# Patient Record
Sex: FEMALE | Race: WHITE | NOT HISPANIC OR LATINO | Employment: FULL TIME | URBAN - METROPOLITAN AREA
[De-identification: names, ages, dates, MRNs, and addresses within clinical notes are randomized per-mention and may not be internally consistent; named-entity substitution may affect disease eponyms.]

---

## 2020-10-02 ENCOUNTER — OFFICE VISIT (OUTPATIENT)
Dept: OBSTETRICS AND GYNECOLOGY | Facility: CLINIC | Age: 40
End: 2020-10-02

## 2020-10-02 VITALS
DIASTOLIC BLOOD PRESSURE: 80 MMHG | HEIGHT: 63 IN | BODY MASS INDEX: 28.53 KG/M2 | RESPIRATION RATE: 14 BRPM | WEIGHT: 161 LBS | SYSTOLIC BLOOD PRESSURE: 120 MMHG

## 2020-10-02 DIAGNOSIS — O03.9 COMPLETE ABORTION: Primary | ICD-10-CM

## 2020-10-02 PROCEDURE — 99203 OFFICE O/P NEW LOW 30 MIN: CPT | Performed by: OBSTETRICS & GYNECOLOGY

## 2020-10-02 RX ORDER — CETIRIZINE HYDROCHLORIDE 10 MG/1
10 TABLET ORAL DAILY
COMMUNITY

## 2020-10-02 RX ORDER — FLUTICASONE PROPIONATE 50 MCG
2 SPRAY, SUSPENSION (ML) NASAL DAILY
COMMUNITY

## 2020-10-02 RX ORDER — PNV,CALCIUM 72/IRON/FOLIC ACID 27 MG-1 MG
TABLET ORAL
COMMUNITY
Start: 2020-08-28

## 2020-10-02 NOTE — PROGRESS NOTES
Subjective   Ladonna Gil is a 40 y.o. female is here today as a self referral.    Chief Complaint   Patient presents with   • Establish Care     Patient is here today to establish care she is 40 years of age and wants to have a baby. She had a miscarriage last month.         History of Present Illness  Patient is been trying to become pregnant for 2 years.  She was late on her cycle in August and had a positive urine pregnancy test.  She saw her OB/GYN and a beta-hCG was 33.  The patient repeated the beta-hCG 3 days later and it was falling.  Patient started bleeding at that time.  This lasted the usual amount of time for a period and now has stopped.  Patient is now waiting for her next cycle.  A discussion on infertility ensued.  Patient wants to do testing here.  It was suggested she see a reproductive endocrinologist because of her age.  Patient will call when next cycle starts.  She has been doing ovulation predicting testing.  She has a good understanding of infertility.  She has had problems having sexual relationships with her  at the appropriate time to become pregnant.. The patient is O+  The following portions of the patient's history were reviewed and updated as appropriate: allergies, current medications, past family history, past medical history, past social history, past surgical history and problem list.    Review of Systems - History obtained from the patient  General ROS: negative  Psychological ROS: positive for - anxiety and depression  ENT ROS: positive for - nasal congestion  Allergy and Immunology ROS: positive for - seasonal allergies  Hematological and Lymphatic ROS: negative  Endocrine ROS: negative  Breast ROS: negative for breast lumps  Respiratory ROS: no cough, shortness of breath, or wheezing  Cardiovascular ROS: no chest pain or dyspnea on exertion  Gastrointestinal ROS: no abdominal pain, change in bowel habits, or black or bloody stools  Genito-Urinary ROS: no dysuria,  trouble voiding, or hematuria  Musculoskeletal ROS: negative  Neurological ROS: no TIA or stroke symptoms  Dermatological ROS: negative     Objective   General:  well developed; well nourished  no acute distress   Skin:  No suspicious lesions seen   Thyroid: not examined   Breasts:  Examined in supine position  Symmetric without masses or skin dimpling  Nipples normal without inversion, lesions or discharge  There are no palpable axillary nodes   Abdomen: soft, non-tender; no masses  no umbilical or inguinal hernias are present  no hepato-splenomegaly   Heart: regular rate and rhythm, S1, S2 normal, no murmur, click, rub or gallop   Lungs: clear to auscultation   Pelvis: Clinical staff was present for exam  External genitalia:  normal appearance of the external genitalia including Bartholin's and Lovelock's glands.  :  urethral meatus normal;  Vaginal:  normal pink mucosa without prolapse or lesions. blood present -  small amount;  Cervix:  normal appearance.  Uterus:  normal size, shape and consistency.  Adnexa:  normal bimanual exam of the adnexa.  Rectal:  digital rectal exam not performed; anus visually normal appearing.         Assessment/Plan   Ladonna was seen today for establish care.    Diagnoses and all orders for this visit:    Complete abortion        Call if ovulation predictor test is positive  Beta-hCG and serum progesterone of late on cycle  Prometrium if needed    Tobias Shelton MD

## 2020-10-21 DIAGNOSIS — N92.6 IRREGULAR PERIODS: Primary | ICD-10-CM

## 2020-10-21 DIAGNOSIS — N92.6 IRREGULAR PERIODS: ICD-10-CM

## 2020-10-27 ENCOUNTER — LAB (OUTPATIENT)
Dept: LAB | Facility: HOSPITAL | Age: 40
End: 2020-10-27

## 2020-10-27 DIAGNOSIS — N92.6 IRREGULAR PERIODS: ICD-10-CM

## 2020-10-27 DIAGNOSIS — N92.6 IRREGULAR PERIODS: Primary | ICD-10-CM

## 2020-10-27 PROCEDURE — 84144 ASSAY OF PROGESTERONE: CPT

## 2020-10-27 PROCEDURE — 36415 COLL VENOUS BLD VENIPUNCTURE: CPT

## 2020-10-28 LAB — PROGEST SERPL-MCNC: 12.9 NG/ML

## 2020-10-29 ENCOUNTER — TELEPHONE (OUTPATIENT)
Dept: OBSTETRICS AND GYNECOLOGY | Facility: CLINIC | Age: 40
End: 2020-10-29

## 2020-10-29 NOTE — TELEPHONE ENCOUNTER
Patient was called and informed that her progesterone level was high indicating ovulation.    Tobias Shelton MD

## 2021-11-10 ENCOUNTER — LAB (OUTPATIENT)
Dept: LAB | Facility: HOSPITAL | Age: 41
End: 2021-11-10

## 2021-11-10 ENCOUNTER — TRANSCRIBE ORDERS (OUTPATIENT)
Dept: LAB | Facility: HOSPITAL | Age: 41
End: 2021-11-10

## 2021-11-10 DIAGNOSIS — Z3A.12 12 WEEKS GESTATION OF PREGNANCY: Primary | ICD-10-CM

## 2021-11-10 DIAGNOSIS — Z3A.12 12 WEEKS GESTATION OF PREGNANCY: ICD-10-CM

## 2021-11-10 DIAGNOSIS — Z34.81 PRENATAL CARE, SUBSEQUENT PREGNANCY, FIRST TRIMESTER: ICD-10-CM

## 2021-11-10 LAB
ABO GROUP BLD: NORMAL
AMPHET+METHAMPHET UR QL: NEGATIVE
AMPHETAMINES UR QL: NEGATIVE
BARBITURATES UR QL SCN: NEGATIVE
BASOPHILS # BLD AUTO: 0.04 10*3/MM3 (ref 0–0.2)
BASOPHILS NFR BLD AUTO: 0.5 % (ref 0–1.5)
BENZODIAZ UR QL SCN: NEGATIVE
BILIRUB UR QL STRIP: NEGATIVE
BLD GP AB SCN SERPL QL: NEGATIVE
BUPRENORPHINE SERPL-MCNC: NEGATIVE NG/ML
CANNABINOIDS SERPL QL: NEGATIVE
CLARITY UR: CLEAR
COCAINE UR QL: NEGATIVE
COLOR UR: YELLOW
DEPRECATED RDW RBC AUTO: 42.7 FL (ref 37–54)
EOSINOPHIL # BLD AUTO: 0.06 10*3/MM3 (ref 0–0.4)
EOSINOPHIL NFR BLD AUTO: 0.7 % (ref 0.3–6.2)
ERYTHROCYTE [DISTWIDTH] IN BLOOD BY AUTOMATED COUNT: 12.4 % (ref 12.3–15.4)
GLUCOSE UR STRIP-MCNC: NEGATIVE MG/DL
HCT VFR BLD AUTO: 39.7 % (ref 34–46.6)
HGB BLD-MCNC: 13.2 G/DL (ref 12–15.9)
HGB UR QL STRIP.AUTO: NEGATIVE
IMM GRANULOCYTES # BLD AUTO: 0.03 10*3/MM3 (ref 0–0.05)
IMM GRANULOCYTES NFR BLD AUTO: 0.3 % (ref 0–0.5)
KETONES UR QL STRIP: NEGATIVE
LEUKOCYTE ESTERASE UR QL STRIP.AUTO: NEGATIVE
LYMPHOCYTES # BLD AUTO: 1.68 10*3/MM3 (ref 0.7–3.1)
LYMPHOCYTES NFR BLD AUTO: 19.4 % (ref 19.6–45.3)
MCH RBC QN AUTO: 31.5 PG (ref 26.6–33)
MCHC RBC AUTO-ENTMCNC: 33.2 G/DL (ref 31.5–35.7)
MCV RBC AUTO: 94.7 FL (ref 79–97)
METHADONE UR QL SCN: NEGATIVE
MONOCYTES # BLD AUTO: 0.55 10*3/MM3 (ref 0.1–0.9)
MONOCYTES NFR BLD AUTO: 6.3 % (ref 5–12)
NEUTROPHILS NFR BLD AUTO: 6.31 10*3/MM3 (ref 1.7–7)
NEUTROPHILS NFR BLD AUTO: 72.8 % (ref 42.7–76)
NITRITE UR QL STRIP: NEGATIVE
NRBC BLD AUTO-RTO: 0 /100 WBC (ref 0–0.2)
OPIATES UR QL: NEGATIVE
OXYCODONE UR QL SCN: NEGATIVE
PCP UR QL SCN: NEGATIVE
PH UR STRIP.AUTO: 7 [PH] (ref 5–8)
PLATELET # BLD AUTO: 314 10*3/MM3 (ref 140–450)
PMV BLD AUTO: 11.6 FL (ref 6–12)
PROPOXYPH UR QL: NEGATIVE
PROT UR QL STRIP: NEGATIVE
RBC # BLD AUTO: 4.19 10*6/MM3 (ref 3.77–5.28)
RH BLD: POSITIVE
SP GR UR STRIP: 1.01 (ref 1–1.03)
TRICYCLICS UR QL SCN: NEGATIVE
UROBILINOGEN UR QL STRIP: NORMAL
WBC # BLD AUTO: 8.67 10*3/MM3 (ref 3.4–10.8)

## 2021-11-10 PROCEDURE — 86778 TOXOPLASMA ANTIBODY IGM: CPT

## 2021-11-10 PROCEDURE — 81003 URINALYSIS AUTO W/O SCOPE: CPT

## 2021-11-10 PROCEDURE — 84443 ASSAY THYROID STIM HORMONE: CPT

## 2021-11-10 PROCEDURE — 36415 COLL VENOUS BLD VENIPUNCTURE: CPT

## 2021-11-10 PROCEDURE — 86803 HEPATITIS C AB TEST: CPT

## 2021-11-10 PROCEDURE — 80306 DRUG TEST PRSMV INSTRMNT: CPT

## 2021-11-10 PROCEDURE — 82306 VITAMIN D 25 HYDROXY: CPT

## 2021-11-10 PROCEDURE — 80081 OBSTETRIC PANEL INC HIV TSTG: CPT

## 2021-11-10 PROCEDURE — 86777 TOXOPLASMA ANTIBODY: CPT

## 2021-11-11 LAB
25(OH)D3 SERPL-MCNC: 38.5 NG/ML (ref 30–100)
HBV SURFACE AG SERPL QL IA: NORMAL
HCV AB SER DONR QL: NORMAL
HIV1+2 AB SER QL: NORMAL
RPR SER QL: NORMAL
TSH SERPL DL<=0.05 MIU/L-ACNC: 1.71 UIU/ML (ref 0.27–4.2)

## 2021-11-12 LAB
LABORATORY COMMENT REPORT: NORMAL
RUBV IGG SERPL IA-ACNC: 14.9 INDEX
T GONDII IGG SERPL IA-ACNC: <3 IU/ML (ref 0–7.1)
T GONDII IGM SER IA-ACNC: <3 AU/ML (ref 0–7.9)

## 2022-02-08 ENCOUNTER — TRANSCRIBE ORDERS (OUTPATIENT)
Dept: LAB | Facility: HOSPITAL | Age: 42
End: 2022-02-08

## 2022-02-08 ENCOUNTER — LAB (OUTPATIENT)
Dept: LAB | Facility: HOSPITAL | Age: 42
End: 2022-02-08

## 2022-02-08 DIAGNOSIS — O09.522 SUPERVISION OF ELDERLY MULTIGRAVIDA IN SECOND TRIMESTER: Primary | ICD-10-CM

## 2022-02-08 DIAGNOSIS — O09.522 SUPERVISION OF ELDERLY MULTIGRAVIDA IN SECOND TRIMESTER: ICD-10-CM

## 2022-02-08 LAB
BLD GP AB SCN SERPL QL: NEGATIVE
DEPRECATED RDW RBC AUTO: 44.2 FL (ref 37–54)
ERYTHROCYTE [DISTWIDTH] IN BLOOD BY AUTOMATED COUNT: 12.7 % (ref 12.3–15.4)
GLUCOSE 1H P GLC SERPL-MCNC: 90 MG/DL (ref 65–139)
GLUCOSE P FAST SERPL-MCNC: 84 MG/DL (ref 65–94)
HCT VFR BLD AUTO: 36.6 % (ref 34–46.6)
HGB BLD-MCNC: 12.8 G/DL (ref 12–15.9)
MCH RBC QN AUTO: 32.6 PG (ref 26.6–33)
MCHC RBC AUTO-ENTMCNC: 35 G/DL (ref 31.5–35.7)
MCV RBC AUTO: 93.1 FL (ref 79–97)
PLATELET # BLD AUTO: 309 10*3/MM3 (ref 140–450)
PMV BLD AUTO: 11 FL (ref 6–12)
RBC # BLD AUTO: 3.93 10*6/MM3 (ref 3.77–5.28)
WBC NRBC COR # BLD: 9.4 10*3/MM3 (ref 3.4–10.8)

## 2022-02-08 PROCEDURE — 82950 GLUCOSE TEST: CPT

## 2022-02-08 PROCEDURE — 85027 COMPLETE CBC AUTOMATED: CPT

## 2022-02-08 PROCEDURE — 82962 GLUCOSE BLOOD TEST: CPT

## 2022-02-08 PROCEDURE — 82947 ASSAY GLUCOSE BLOOD QUANT: CPT

## 2022-02-08 PROCEDURE — 36415 COLL VENOUS BLD VENIPUNCTURE: CPT

## 2022-02-08 PROCEDURE — 86850 RBC ANTIBODY SCREEN: CPT

## 2022-03-12 ENCOUNTER — HOSPITAL ENCOUNTER (INPATIENT)
Facility: HOSPITAL | Age: 42
LOS: 1 days | Discharge: ANOTHER HOSPITAL | End: 2022-03-14
Attending: NURSE PRACTITIONER | Admitting: OBSTETRICS & GYNECOLOGY

## 2022-03-12 PROBLEM — Z87.59 HISTORY OF PRETERM PREMATURE RUPTURE OF MEMBRANES (PPROM): Status: ACTIVE | Noted: 2022-03-12

## 2022-03-12 LAB
ABO GROUP BLD: NORMAL
ALP SERPL-CCNC: 77 U/L (ref 39–117)
ALT SERPL W P-5'-P-CCNC: 28 U/L (ref 1–33)
AST SERPL-CCNC: 22 U/L (ref 1–32)
BILIRUB SERPL-MCNC: 0.2 MG/DL (ref 0–1.2)
BLD GP AB SCN SERPL QL: NEGATIVE
CREAT SERPL-MCNC: 0.58 MG/DL (ref 0.57–1)
DEPRECATED RDW RBC AUTO: 41.7 FL (ref 37–54)
ERYTHROCYTE [DISTWIDTH] IN BLOOD BY AUTOMATED COUNT: 12.7 % (ref 12.3–15.4)
HCT VFR BLD AUTO: 33.3 % (ref 34–46.6)
HGB BLD-MCNC: 12.1 G/DL (ref 12–15.9)
LDH SERPL-CCNC: 410 U/L (ref 135–214)
MCH RBC QN AUTO: 32.7 PG (ref 26.6–33)
MCHC RBC AUTO-ENTMCNC: 36.3 G/DL (ref 31.5–35.7)
MCV RBC AUTO: 90 FL (ref 79–97)
PLATELET # BLD AUTO: 302 10*3/MM3 (ref 140–450)
PMV BLD AUTO: 10.3 FL (ref 6–12)
RBC # BLD AUTO: 3.7 10*6/MM3 (ref 3.77–5.28)
RH BLD: POSITIVE
T&S EXPIRATION DATE: NORMAL
URATE SERPL-MCNC: 2.7 MG/DL (ref 2.4–5.7)
WBC NRBC COR # BLD: 10.96 10*3/MM3 (ref 3.4–10.8)

## 2022-03-12 PROCEDURE — 83615 LACTATE (LD) (LDH) ENZYME: CPT | Performed by: OBSTETRICS & GYNECOLOGY

## 2022-03-12 PROCEDURE — 82565 ASSAY OF CREATININE: CPT | Performed by: OBSTETRICS & GYNECOLOGY

## 2022-03-12 PROCEDURE — 84550 ASSAY OF BLOOD/URIC ACID: CPT | Performed by: OBSTETRICS & GYNECOLOGY

## 2022-03-12 PROCEDURE — 0 MAGNESIUM SULFATE 20 GM/500ML SOLUTION: Performed by: OBSTETRICS & GYNECOLOGY

## 2022-03-12 PROCEDURE — 86901 BLOOD TYPING SEROLOGIC RH(D): CPT | Performed by: OBSTETRICS & GYNECOLOGY

## 2022-03-12 PROCEDURE — 86850 RBC ANTIBODY SCREEN: CPT | Performed by: OBSTETRICS & GYNECOLOGY

## 2022-03-12 PROCEDURE — 85027 COMPLETE CBC AUTOMATED: CPT | Performed by: OBSTETRICS & GYNECOLOGY

## 2022-03-12 PROCEDURE — 86900 BLOOD TYPING SEROLOGIC ABO: CPT | Performed by: OBSTETRICS & GYNECOLOGY

## 2022-03-12 PROCEDURE — 82247 BILIRUBIN TOTAL: CPT | Performed by: OBSTETRICS & GYNECOLOGY

## 2022-03-12 PROCEDURE — 25010000002 AMPICILLIN PER 500 MG: Performed by: OBSTETRICS & GYNECOLOGY

## 2022-03-12 PROCEDURE — 84075 ASSAY ALKALINE PHOSPHATASE: CPT | Performed by: OBSTETRICS & GYNECOLOGY

## 2022-03-12 PROCEDURE — 25010000002 BETAMETHASONE ACET & SOD PHOS PER 4 MG: Performed by: OBSTETRICS & GYNECOLOGY

## 2022-03-12 PROCEDURE — 84460 ALANINE AMINO (ALT) (SGPT): CPT | Performed by: OBSTETRICS & GYNECOLOGY

## 2022-03-12 PROCEDURE — 36415 COLL VENOUS BLD VENIPUNCTURE: CPT | Performed by: OBSTETRICS & GYNECOLOGY

## 2022-03-12 PROCEDURE — 84450 TRANSFERASE (AST) (SGOT): CPT | Performed by: OBSTETRICS & GYNECOLOGY

## 2022-03-12 RX ORDER — PRENATAL VIT/IRON FUM/FOLIC AC 27MG-0.8MG
1 TABLET ORAL DAILY
Status: DISCONTINUED | OUTPATIENT
Start: 2022-03-13 | End: 2022-03-14 | Stop reason: HOSPADM

## 2022-03-12 RX ORDER — SODIUM CHLORIDE 0.9 % (FLUSH) 0.9 %
10 SYRINGE (ML) INJECTION EVERY 12 HOURS SCHEDULED
Status: DISCONTINUED | OUTPATIENT
Start: 2022-03-12 | End: 2022-03-14

## 2022-03-12 RX ORDER — ERGOCALCIFEROL (VITAMIN D2) 10 MCG
400 TABLET ORAL DAILY
COMMUNITY

## 2022-03-12 RX ORDER — PROMETHAZINE HYDROCHLORIDE 12.5 MG/1
12.5 TABLET ORAL EVERY 6 HOURS PRN
Status: DISCONTINUED | OUTPATIENT
Start: 2022-03-12 | End: 2022-03-14

## 2022-03-12 RX ORDER — AZITHROMYCIN 250 MG/1
1000 TABLET, FILM COATED ORAL ONCE
Status: COMPLETED | OUTPATIENT
Start: 2022-03-12 | End: 2022-03-13

## 2022-03-12 RX ORDER — BETAMETHASONE SODIUM PHOSPHATE AND BETAMETHASONE ACETATE 3; 3 MG/ML; MG/ML
12 INJECTION, SUSPENSION INTRA-ARTICULAR; INTRALESIONAL; INTRAMUSCULAR; SOFT TISSUE EVERY 24 HOURS
Status: DISCONTINUED | OUTPATIENT
Start: 2022-03-12 | End: 2022-03-13

## 2022-03-12 RX ORDER — ACETAMINOPHEN 325 MG/1
650 TABLET ORAL EVERY 4 HOURS PRN
Status: DISCONTINUED | OUTPATIENT
Start: 2022-03-12 | End: 2022-03-14 | Stop reason: HOSPADM

## 2022-03-12 RX ORDER — ONDANSETRON 4 MG/1
4 TABLET, FILM COATED ORAL EVERY 8 HOURS PRN
Status: DISCONTINUED | OUTPATIENT
Start: 2022-03-12 | End: 2022-03-14

## 2022-03-12 RX ORDER — PROMETHAZINE HYDROCHLORIDE 12.5 MG/1
12.5 SUPPOSITORY RECTAL EVERY 6 HOURS PRN
Status: DISCONTINUED | OUTPATIENT
Start: 2022-03-12 | End: 2022-03-14

## 2022-03-12 RX ORDER — FLUTICASONE PROPIONATE 50 MCG
2 SPRAY, SUSPENSION (ML) NASAL DAILY
Status: DISCONTINUED | OUTPATIENT
Start: 2022-03-13 | End: 2022-03-14 | Stop reason: HOSPADM

## 2022-03-12 RX ORDER — SODIUM CHLORIDE 0.9 % (FLUSH) 0.9 %
10 SYRINGE (ML) INJECTION AS NEEDED
Status: DISCONTINUED | OUTPATIENT
Start: 2022-03-12 | End: 2022-03-14

## 2022-03-12 RX ORDER — ONDANSETRON 2 MG/ML
4 INJECTION INTRAMUSCULAR; INTRAVENOUS EVERY 8 HOURS PRN
Status: DISCONTINUED | OUTPATIENT
Start: 2022-03-12 | End: 2022-03-14

## 2022-03-12 RX ORDER — AZITHROMYCIN 250 MG/1
1000 TABLET, FILM COATED ORAL ONCE
Status: DISCONTINUED | OUTPATIENT
Start: 2022-03-12 | End: 2022-03-12

## 2022-03-12 RX ORDER — AMOXICILLIN 250 MG/1
500 CAPSULE ORAL EVERY 8 HOURS
Status: DISCONTINUED | OUTPATIENT
Start: 2022-03-14 | End: 2022-03-12

## 2022-03-12 RX ORDER — SODIUM CHLORIDE, SODIUM LACTATE, POTASSIUM CHLORIDE, CALCIUM CHLORIDE 600; 310; 30; 20 MG/100ML; MG/100ML; MG/100ML; MG/100ML
30 INJECTION, SOLUTION INTRAVENOUS CONTINUOUS
Status: DISCONTINUED | OUTPATIENT
Start: 2022-03-12 | End: 2022-03-14

## 2022-03-12 RX ORDER — ASPIRIN 81 MG/1
81 TABLET, CHEWABLE ORAL DAILY
COMMUNITY

## 2022-03-12 RX ORDER — AMOXICILLIN 250 MG/1
500 CAPSULE ORAL EVERY 8 HOURS SCHEDULED
Status: DISCONTINUED | OUTPATIENT
Start: 2022-03-14 | End: 2022-03-14

## 2022-03-12 RX ORDER — LIDOCAINE HYDROCHLORIDE 10 MG/ML
5 INJECTION, SOLUTION EPIDURAL; INFILTRATION; INTRACAUDAL; PERINEURAL AS NEEDED
Status: DISCONTINUED | OUTPATIENT
Start: 2022-03-12 | End: 2022-03-14

## 2022-03-12 RX ORDER — MAGNESIUM SULFATE HEPTAHYDRATE 40 MG/ML
2 INJECTION, SOLUTION INTRAVENOUS CONTINUOUS
Status: DISCONTINUED | OUTPATIENT
Start: 2022-03-12 | End: 2022-03-14

## 2022-03-12 RX ADMIN — AMPICILLIN 2 G: 2 INJECTION, POWDER, FOR SOLUTION INTRAVENOUS at 22:26

## 2022-03-12 RX ADMIN — BETAMETHASONE SODIUM PHOSPHATE AND BETAMETHASONE ACETATE 12 MG: 3; 3 INJECTION, SUSPENSION INTRA-ARTICULAR; INTRALESIONAL; INTRAMUSCULAR at 21:45

## 2022-03-12 RX ADMIN — MAGNESIUM SULFATE IN WATER 2 G/HR: 40 INJECTION, SOLUTION INTRAVENOUS at 22:00

## 2022-03-12 RX ADMIN — SODIUM CHLORIDE, POTASSIUM CHLORIDE, SODIUM LACTATE AND CALCIUM CHLORIDE 30 ML/HR: 600; 310; 30; 20 INJECTION, SOLUTION INTRAVENOUS at 21:30

## 2022-03-13 PROBLEM — O42.919 PRETERM PREMATURE RUPTURE OF MEMBRANES (PPROM) WITH UNKNOWN ONSET OF LABOR: Status: ACTIVE | Noted: 2022-03-13

## 2022-03-13 LAB — POC AMNISURE: POSITIVE

## 2022-03-13 PROCEDURE — 84112 EVAL AMNIOTIC FLUID PROTEIN: CPT | Performed by: OBSTETRICS & GYNECOLOGY

## 2022-03-13 PROCEDURE — 25010000002 BETAMETHASONE ACET & SOD PHOS PER 4 MG: Performed by: OBSTETRICS & GYNECOLOGY

## 2022-03-13 PROCEDURE — 0 MAGNESIUM SULFATE 20 GM/500ML SOLUTION: Performed by: OBSTETRICS & GYNECOLOGY

## 2022-03-13 PROCEDURE — 59025 FETAL NON-STRESS TEST: CPT

## 2022-03-13 PROCEDURE — 25010000002 AMPICILLIN PER 500 MG: Performed by: OBSTETRICS & GYNECOLOGY

## 2022-03-13 PROCEDURE — 99221 1ST HOSP IP/OBS SF/LOW 40: CPT | Performed by: OBSTETRICS & GYNECOLOGY

## 2022-03-13 RX ORDER — ECHINACEA PURPUREA EXTRACT 125 MG
2 TABLET ORAL AS NEEDED
Status: DISCONTINUED | OUTPATIENT
Start: 2022-03-13 | End: 2022-03-14 | Stop reason: HOSPADM

## 2022-03-13 RX ORDER — BETAMETHASONE SODIUM PHOSPHATE AND BETAMETHASONE ACETATE 3; 3 MG/ML; MG/ML
12 INJECTION, SUSPENSION INTRA-ARTICULAR; INTRALESIONAL; INTRAMUSCULAR; SOFT TISSUE EVERY 12 HOURS
Status: COMPLETED | OUTPATIENT
Start: 2022-03-13 | End: 2022-03-13

## 2022-03-13 RX ORDER — BETAMETHASONE SODIUM PHOSPHATE AND BETAMETHASONE ACETATE 3; 3 MG/ML; MG/ML
12 INJECTION, SUSPENSION INTRA-ARTICULAR; INTRALESIONAL; INTRAMUSCULAR; SOFT TISSUE EVERY 12 HOURS
Status: DISCONTINUED | OUTPATIENT
Start: 2022-03-13 | End: 2022-03-13

## 2022-03-13 RX ORDER — FAMOTIDINE 20 MG/1
20 TABLET, FILM COATED ORAL NIGHTLY
Status: DISCONTINUED | OUTPATIENT
Start: 2022-03-13 | End: 2022-03-14

## 2022-03-13 RX ADMIN — BETAMETHASONE SODIUM PHOSPHATE AND BETAMETHASONE ACETATE 12 MG: 3; 3 INJECTION, SUSPENSION INTRA-ARTICULAR; INTRALESIONAL; INTRAMUSCULAR at 10:48

## 2022-03-13 RX ADMIN — AMPICILLIN 2 G: 2 INJECTION, POWDER, FOR SOLUTION INTRAVENOUS at 18:30

## 2022-03-13 RX ADMIN — AMPICILLIN 2 G: 2 INJECTION, POWDER, FOR SOLUTION INTRAVENOUS at 11:46

## 2022-03-13 RX ADMIN — MAGNESIUM SULFATE IN WATER 2 G/HR: 40 INJECTION, SOLUTION INTRAVENOUS at 06:03

## 2022-03-13 RX ADMIN — FAMOTIDINE 20 MG: 20 TABLET, FILM COATED ORAL at 20:44

## 2022-03-13 RX ADMIN — MAGNESIUM SULFATE IN WATER 2 G/HR: 40 INJECTION, SOLUTION INTRAVENOUS at 16:51

## 2022-03-13 RX ADMIN — AMPICILLIN 2 G: 2 INJECTION, POWDER, FOR SOLUTION INTRAVENOUS at 06:07

## 2022-03-13 RX ADMIN — PRENATAL VITAMINS-IRON FUMARATE 27 MG IRON-FOLIC ACID 0.8 MG TABLET 1 TABLET: at 08:12

## 2022-03-13 RX ADMIN — AZITHROMYCIN DIHYDRATE 1000 MG: 250 TABLET, FILM COATED ORAL at 03:01

## 2022-03-13 RX ADMIN — Medication 10 ML: at 08:18

## 2022-03-13 RX ADMIN — AMPICILLIN 2 G: 2 INJECTION, POWDER, FOR SOLUTION INTRAVENOUS at 23:30

## 2022-03-13 RX ADMIN — ACETAMINOPHEN 650 MG: 325 TABLET ORAL at 19:37

## 2022-03-13 RX ADMIN — SODIUM CHLORIDE, POTASSIUM CHLORIDE, SODIUM LACTATE AND CALCIUM CHLORIDE 30 ML/HR: 600; 310; 30; 20 INJECTION, SOLUTION INTRAVENOUS at 16:51

## 2022-03-13 NOTE — H&P
[facility]  Obstetric History and Physical    Chief Complaint   Patient presents with   • Rupture of Membranes       HPI:    Patient is a 42 y.o. female  currently at 29w6d, who presents with grossly ruptured membranes that occurred around 2000 hrs.  She says she has up to this point had an uneventful pregnancy including a normal anatomy scan at 20 weeks.   She denies vaginal bleeding prior to arrival, but has past a small clot since being here.   +FM.   She is starting to feel more contractions and it seem to be getting worse even after the Magnesium was started.    She denies fever or chills.  She denies uterine tenderness other than the contractions.         Prenatal Information:  Prenatal Results     POC Urine Glucose/Protein     Test Value Reference Range Date Time    Urine Glucose        Urine Protein              Initial Prenatal Labs     Test Value Reference Range Date Time    Hemoglobin  12.1 g/dL 12.0 - 15.9 22       12.8 g/dL 12.0 - 15.9 22 0954       13.2 g/dL 12.0 - 15.9 11/10/21 1702    Hematocrit  33.3 % 34.0 - 46.6 22       36.6 % 34.0 - 46.6 22 0954       39.7 % 34.0 - 46.6 11/10/21 1702    Platelets  302 10*3/mm3 140 - 450 220       309 10*3/mm3 140 - 450 22 0954       314 10*3/mm3 140 - 450 11/10/21 1702    Rubella IgG  14.90 index Immune >0.99 11/10/21 1701    Hepatitis B SAg  Non-Reactive  Non-Reactive 11/10/21 1702    Hepatitis C Ab  Non-Reactive  Non-Reactive 11/10/21 1701    RPR  Non-Reactive  Non-Reactive 11/10/21 1701    ABO  O   22    Rh  Positive   22    Antibody Screen  Negative   22       Negative   22       Negative   11/10/21 1701    HIV  Non-Reactive  Non-Reactive 11/10/21 1701    Urine Culture        Gonorrhea        Chlamydia        TSH  1.710 uIU/mL 0.270 - 4.200 11/10/21 1701    HgB A1c               2nd and 3rd Trimester     Test Value Reference Range Date Time    Hemoglobin  (repeated)  12.1 g/dL 12.0 - 15.9 03/12/22 2140       12.8 g/dL 12.0 - 15.9 02/08/22 0954    Hematocrit (repeated)  33.3 % 34.0 - 46.6 03/12/22 2140       36.6 % 34.0 - 46.6 02/08/22 0954    Platelets   302 10*3/mm3 140 - 450 03/12/22 2140       309 10*3/mm3 140 - 450 02/08/22 0954       314 10*3/mm3 140 - 450 11/10/21 1702    GCT  90 mg/dL 65 - 139 02/08/22 0954    Antibody Screen (repeated)  Negative   03/12/22 2140       Negative   02/08/22 0954    GTT Fasting  84 mg/dL 65 - 94 02/08/22 0954    GTT 1 Hr        GTT 2 Hr        GTT 3 Hr        Group B Strep              Drug Screening     Test Value Reference Range Date Time    Amphetamine Screen  Negative  Negative 11/10/21 1647    Barbiturate Screen  Negative  Negative 11/10/21 1647    Benzodiazepine Screen  Negative  Negative 11/10/21 1647    Methadone Screen  Negative  Negative 11/10/21 1647    Phencyclidine Screen  Negative  Negative 11/10/21 1647    Opiates Screen  Negative  Negative 11/10/21 1647    THC Screen  Negative  Negative 11/10/21 1647    Cocaine Screen  Negative  Negative 11/10/21 1647    Propoxyphene Screen  Negative  Negative 11/10/21 1647    Buprenorphine Screen  Negative  Negative 11/10/21 1647    Methamphetamine Screen  Negative  Negative 11/10/21 1647    Oxycodone Screen  Negative  Negative 11/10/21 1647    Tricyclic Antidepressants Screen  Negative  Negative 11/10/21 1647          Other (Risk screening)     Test Value Reference Range Date Time    Varicella IgG        Parvovirus IgG        CMV IgG        Cystic Fibrosis        Hemoglobin electrophoresis        NIPT        MSAFP-4        AFP (for NTD only)              Legend    ^: Historical                      External Prenatal Results     Pregnancy Outside Results - Transcribed From Office Records - See Scanned Records For Details     Test Value Date Time    ABO  O  03/12/22 2140    Rh  Positive  03/12/22 2140    Antibody Screen  Negative  03/12/22 2140       Negative  02/08/22 0954        Negative  11/10/21 1701    Varicella IgG       Rubella  14.90 index 11/10/21 1701    Hgb  12.1 g/dL 22 2140       12.8 g/dL 22 0954       13.2 g/dL 11/10/21 1702    Hct  33.3 % 22 2140       36.6 % 22 0954       39.7 % 11/10/21 1702    Glucose Fasting GTT  84 mg/dL 22 0954    Glucose Tolerance Test 1 hour       Glucose Tolerance Test 3 hour       Gonorrhea (discrete)       Chlamydia (discrete)       RPR  Non-Reactive  11/10/21 1701    VDRL       Syphilis Antibody       HBsAg  Non-Reactive  11/10/21 1702    Herpes Simplex Virus PCR       Herpes Simplex VIrus Culture       HIV  Non-Reactive  11/10/21 1701    Hep C RNA Quant PCR       Hep C Antibody  Non-Reactive  11/10/21 1701    AFP       Group B Strep       GBS Susceptibility to Clindamycin       GBS Susceptibility to Erythromycin       Fetal Fibronectin       Genetic Testing, Maternal Blood             Drug Screening     Test Value Date Time    Urine Drug Screen       Amphetamine Screen  Negative  11/10/21 1647    Barbiturate Screen  Negative  11/10/21 1647    Benzodiazepine Screen  Negative  11/10/21 1647    Methadone Screen  Negative  11/10/21 1647    Phencyclidine Screen  Negative  11/10/21 1647    Opiates Screen  Negative  11/10/21 1647    THC Screen  Negative  11/10/21 1647    Cocaine Screen       Propoxyphene Screen  Negative  11/10/21 1647    Buprenorphine Screen  Negative  11/10/21 1647    Methamphetamine Screen       Oxycodone Screen  Negative  11/10/21 1647    Tricyclic Antidepressants Screen  Negative  11/10/21 1647          Legend    ^: Historical                         Past OB History:     OB History    Para Term  AB Living   2 0 0 0 1 0   SAB IAB Ectopic Molar Multiple Live Births   1 0 0 0 0 0      # Outcome Date GA Lbr Colin/2nd Weight Sex Delivery Anes PTL Lv   2 Current            1 2020               Past Medical History: Past Medical History:   Diagnosis Date   • Anxiety    • Depression    •  Miscarriage    • Seasonal allergies       Past Surgical History Past Surgical History:   Procedure Laterality Date   • WISDOM TOOTH EXTRACTION        Family History: Family History   Problem Relation Age of Onset   • No Known Problems Mother    • Hypertension Father    • Hypertension Sister    • No Known Problems Brother    • No Known Problems Maternal Grandmother    • No Known Problems Maternal Grandfather    • Diabetes Paternal Grandmother    • Breast cancer Paternal Grandmother    • Heart attack Paternal Grandfather       Social History:  reports that she has never smoked. She has never used smokeless tobacco.   reports no history of alcohol use.  Drug use questions deferred to the physician.        Review of Systems:  Denies chest pain and SOA.   All other pertinent positives and negatives are addressed in the HPI      Objective     Vital Signs Range for the last 24 hours  Temperature: Temp:  [98.2 °F (36.8 °C)] 98.2 °F (36.8 °C)   Temp Source: Temp src: Oral   BP: BP: (104-127)/(54-78) 106/54   Pulse: Heart Rate:  [65-76] 65   Respirations: Resp:  [16-18] 18   SPO2: SpO2:  [99 %-100 %] 99 %   O2 Amount (l/min):     O2 Devices     Weight: Weight:  [88.5 kg (195 lb)] 88.5 kg (195 lb)     Physical Examination: General appearance - oriented to person, place, and time.  She is anxious and appropriately concerned, but not in acute distress   Chest - CTAB  Heart - normal rate and regular rhythm, S1 and S2 normal, no murmurs noted  Abdomen - soft, nontender, nondistended, no masses or organomegaly  bowel sounds normal  Extremities - no pedal edema noted    Presentation: Deo Breech    Cervix: Exam by:     Dilation: Cervical Dilation (cm): 1   Effacement: Cervical Effacement: 70%   Station:  -2     Fetal Heart Rate Assessment   Method: Fetal HR Assessment Method: external   Beats/min: Fetal HR (beats/min): 125   Baseline: Fetal HR Baseline: normal range   Variability: Fetal HR Variability: moderate (amplitude range 6  to 25 bpm)   Accels: Fetal HR Accelerations: greater than/equal to 15 bpm, lasting at least 15 seconds   Decels: Fetal HR Decelerations: variable   Tracing Category:       Uterine Assessment   Method: Method: external tocotransducer   Frequency (min):     Ctx Count in 10 min:     Duration:     Intensity:     Intensity by IUPC:     Resting Tone: Uterine Resting Tone: soft by palpation   Resting Tone by IUPC:     Grand Cane Units:       Laboratory Results:    AmniSure   POSITIVE       Assessment/Plan       History of  premature rupture of membranes (PPROM)      Assessment & Plan    Assessment:  1.  Intrauterine pregnancy at 29w6d gestation with variable decelerations present fetal status.    2.  PPROM  3.   contractions  4.  Breech  5.  AMA     Plan:  1. Admit  2. IV, CBC, T&S, PEP  3. Magnesium bolus followed by currently 3gms/hr maintenance  4. NPO  5. BMZ x2  5. PPROM ABX  6. NICU consult  7. MFM consult        Jagjit Chilel MD  3/13/2022  00:21 EST

## 2022-03-13 NOTE — PROGRESS NOTES
[facility]  Antepartum Progress Note    Chief Complaint: PPROM    Subjective   Admitted for PPROM at 2000 on 3/12. Is currently on Mag @ 3g/hr. Called in to see patient due to variables every 10-15 min. She reports contractions about as often. She has had a small amount of bleeding when she uses the rest room, very small clot since admission x1.   Since 0030 Variables have been less frequent, shorter, and more shallow.       Objective     Vital Signs Range for the last 24 hours  Temp:  [98.2 °F (36.8 °C)] 98.2 °F (36.8 °C)   BP: ()/(49-78) 94/49   Heart Rate:  [65-76] 75   Resp:  [16-20] 20   SpO2:  [95 %-100 %] 95 %             Fetal Heart Rate Assessment   Method: Fetal HR Assessment Method: external   Beats/min: Fetal HR (beats/min): 125   Baseline: Fetal HR Baseline: normal range   Varibility: Fetal HR Variability: moderate (amplitude range 6 to 25 bpm)   Accels: Fetal HR Accelerations: greater than/equal to 15 bpm, lasting at least 15 seconds   Decels: Fetal HR Decelerations: variable   Tracing Category:       Uterine Assessment   Method: Method: external tocotransducer   Frequency (min):     Ctx Count in 10 min:     Duration:     Intensity:     Intensity by IUPC:     Resting Tone: Uterine Resting Tone: soft by palpation   Resting Tone by IUPC:       Cervix: Exam by: Method: sterile exam per physician on admission, was not rechecked   Dilation: Cervical Dilation (cm): 1   Effacement: Cervical Effacement: 70%   Station:         Intake/Output this shift:    I/O this shift:  In: -   Out: 2350 [Urine:2350]    Physical Exam:  General: Patient is comfortable, well appearing and in no acute distress   Heart CVS exam: normal rate and regular rhythm.   Lungs Chest: clear to auscultation, no wheezes, rales or rhonchi, symmetric air entry.     Abdomen S/G/NT   Extremities No edema       Laboratory Results  WBC   Date Value Ref Range Status   03/12/2022 10.96 (H) 3.40 - 10.80 10*3/mm3 Final     RBC   Date Value Ref  Range Status   03/12/2022 3.70 (L) 3.77 - 5.28 10*6/mm3 Final     Hemoglobin   Date Value Ref Range Status   03/12/2022 12.1 12.0 - 15.9 g/dL Final     Hematocrit   Date Value Ref Range Status   03/12/2022 33.3 (L) 34.0 - 46.6 % Final     MCV   Date Value Ref Range Status   03/12/2022 90.0 79.0 - 97.0 fL Final     MCH   Date Value Ref Range Status   03/12/2022 32.7 26.6 - 33.0 pg Final     MCHC   Date Value Ref Range Status   03/12/2022 36.3 (H) 31.5 - 35.7 g/dL Final     RDW   Date Value Ref Range Status   03/12/2022 12.7 12.3 - 15.4 % Final     RDW-SD   Date Value Ref Range Status   03/12/2022 41.7 37.0 - 54.0 fl Final     MPV   Date Value Ref Range Status   03/12/2022 10.3 6.0 - 12.0 fL Final     Platelets   Date Value Ref Range Status   03/12/2022 302 140 - 450 10*3/mm3 Final     0.58/2.7/28/410/22      Ultrasound Results: BSUS Breech presentation        Assessment/Plan  IUP @ 29w6d with PPROM       1. PPROM: Amp/Azith for latency. No evidence of labor at this time. Decreased Mag to 2g/hr, will monitor for any changes. If continues to contract can consider adding second tocolytic.   2. BREECH: CD if labors or fetal status changes  3. NICU aware of patient  4. FWB: BMTANMAY #1 @ 8517, overall reassuring tracing at current. Moderate variability with accelerations, less frequent variables than at time of admission. On Mag for NP, continue CEFM. Plan PDC scan Monday am  5. NPO for now, will reassess in the am  6. SCD for DVT PPx  7. COVID + on 3/4, currently very mild sx.       Salma Cohn MD  3/13/2022  01:22 EST

## 2022-03-13 NOTE — NURSING NOTE
PRENATAL CONTACT - NDRT    Requested by OB to meet with parents to update them with delivery and admission procedures for their infant.    Present: mom and dad    Pertinent Prenatal Information:    Gestational Age: 29 6/7 weeks  US report:  Other: rupt of membranes, breech presentation      The following issues were discussed:    1) Admission Procedure.  2) NICU Visitation Policy.  3) Sibling Visitation Policy-N/A  4) Skin to Skin Care ([facility]).  5) Hand Expression for Breast Milk soon after birth.  6) Breast Feeding/Expressing Breast Milk.  7) Tour of NICU offered. Not able to tour due to covid + status of both parents  8) Other Issues Discussed: visitation altered due to covid status of parents.        Concerns Voiced by Parents: none at this time        Serene Lake RN    3/13/2022   00:31 EST

## 2022-03-13 NOTE — PROGRESS NOTES
[facility]  Antepartum Progress Note    Chief Complaint: PPROM day 2    Subjective   Feeling well overall, just tired. +FM, no VB, just spotting with wiping. Occasional cramping about every 20-30 min. No abdominal pain  Overnight there have been fetal heart rate deceleration about every 20-30 min, Variable most are less than 1 min with quick recovery, have been 2 longer between 2 and 4 minutes with spontaneous recovery.     Objective     Vital Signs Range for the last 24 hours  Temp:  [97.2 °F (36.2 °C)-98.3 °F (36.8 °C)] 98.2 °F (36.8 °C)   BP: ()/(49-78) 105/62   Heart Rate:  [64-86] 75   Resp:  [16-20] 16   SpO2:  [95 %-100 %] 96 %             Fetal Heart Rate Assessment   Method: Fetal HR Assessment Method: external   Beats/min: Fetal HR (beats/min): 115   Baseline: Fetal HR Baseline: normal range   Varibility: Fetal HR Variability: moderate (amplitude range 6 to 25 bpm)   Accels: Fetal HR Accelerations: greater than/equal to 15 bpm, lasting at least 15 seconds   Decels: Fetal HR Decelerations: absent   Tracing Category:       Uterine Assessment   Method: Method: external tocotransducer   Frequency (min): Contraction Frequency (Minutes): x1   Ctx Count in 10 min:     Duration:     Intensity: Contraction Intensity: no contractions   Intensity by IUPC:     Resting Tone: Uterine Resting Tone: soft by palpation   Resting Tone by IUPC:       Cervix: not examined       Intake/Output last 24 hours:      Intake/Output Summary (Last 24 hours) at 3/13/2022 1205  Last data filed at 3/13/2022 1151  Gross per 24 hour   Intake 875 ml   Output 4600 ml   Net -3725 ml       Intake/Output this shift:    I/O this shift:  In: -   Out: 900 [Urine:900]    Physical Exam:  General: Patient is comfortable, well appearing and in no acute distress   Heart CVS exam: normal rate and regular rhythm.   Lungs Chest: clear to auscultation, no wheezes, rales or rhonchi, symmetric air entry.     Abdomen S/G/NTTP   Extremities SCD on, no  edema       Laboratory Results  WBC   Date Value Ref Range Status   03/12/2022 10.96 (H) 3.40 - 10.80 10*3/mm3 Final     RBC   Date Value Ref Range Status   03/12/2022 3.70 (L) 3.77 - 5.28 10*6/mm3 Final     Hemoglobin   Date Value Ref Range Status   03/12/2022 12.1 12.0 - 15.9 g/dL Final     Hematocrit   Date Value Ref Range Status   03/12/2022 33.3 (L) 34.0 - 46.6 % Final     MCV   Date Value Ref Range Status   03/12/2022 90.0 79.0 - 97.0 fL Final     MCH   Date Value Ref Range Status   03/12/2022 32.7 26.6 - 33.0 pg Final     MCHC   Date Value Ref Range Status   03/12/2022 36.3 (H) 31.5 - 35.7 g/dL Final     RDW   Date Value Ref Range Status   03/12/2022 12.7 12.3 - 15.4 % Final     RDW-SD   Date Value Ref Range Status   03/12/2022 41.7 37.0 - 54.0 fl Final     MPV   Date Value Ref Range Status   03/12/2022 10.3 6.0 - 12.0 fL Final     Platelets   Date Value Ref Range Status   03/12/2022 302 140 - 450 10*3/mm3 Final         Ultrasound Results:   None new      Assessment/Plan  IUP @ 29w6d with PPROM       1. PPROM Day 2: Amp/Azith for latency, no evidence of labor or chorio. Plan cbc q 72 hours with t&S unless otherwise indicated. On mag for NP/tocolysis through window  2. FWB: overall is reassuring. Decelerations every 25-30 min with ctx. Recovers well, moderate variability between. On mag for NP. BMZ #2 given 3/13 @ 945 in 12 hour dosing due to decelerations overnight. Cont CEFM for now. Mag through steroid window or longer depending on fetal status. PDC scan in am  3. BREECH CD for delivery  4. Diet: regular  5. DVT ppx: SCD  6. Continue Inpatient mgmt until delivery  7. NICU consult tomorrow  8. COVID +: minimal sx. Quarantine up 3/14/22      Salma Cohn MD  3/13/2022  12:05 EDT

## 2022-03-14 ENCOUNTER — ANESTHESIA (OUTPATIENT)
Dept: LABOR AND DELIVERY | Facility: HOSPITAL | Age: 42
End: 2022-03-14

## 2022-03-14 ENCOUNTER — ANESTHESIA EVENT (OUTPATIENT)
Dept: LABOR AND DELIVERY | Facility: HOSPITAL | Age: 42
End: 2022-03-14

## 2022-03-14 VITALS
BODY MASS INDEX: 33.29 KG/M2 | SYSTOLIC BLOOD PRESSURE: 104 MMHG | TEMPERATURE: 97.7 F | HEIGHT: 64 IN | WEIGHT: 195 LBS | DIASTOLIC BLOOD PRESSURE: 61 MMHG | OXYGEN SATURATION: 99 % | RESPIRATION RATE: 18 BRPM | HEART RATE: 61 BPM

## 2022-03-14 LAB
ATMOSPHERIC PRESS: ABNORMAL MM[HG]
BASE EXCESS BLDCOV CALC-SCNC: -3.4 MMOL/L (ref 0–2)
BDY SITE: ABNORMAL
BODY TEMPERATURE: 37 C
CO2 BLDA-SCNC: 19.9 MMOL/L (ref 22–33)
COLLECT TME SMN: ABNORMAL
EPAP: 0
HCO3 BLDCOV-SCNC: 19.1 MMOL/L (ref 18.6–21.4)
HCT VFR BLD AUTO: 29.9 % (ref 34–46.6)
HGB BLD-MCNC: 10 G/DL (ref 12–15.9)
HGB BLDA-MCNC: 15.6 G/DL (ref 14–18)
INHALED O2 CONCENTRATION: 21 %
IPAP: 0
Lab: ABNORMAL
MODALITY: ABNORMAL
NOTE: ABNORMAL
NOTIFIED BY: ABNORMAL
NOTIFIED WHO: ABNORMAL
PAW @ PEAK INSP FLOW SETTING VENT: 0 CMH2O
PCO2 BLDCOV: 27.8 MM HG (ref 28–40)
PH BLDCOV: 7.45 PH UNITS (ref 7.31–7.37)
PO2 BLDCOV: 58.5 MM HG (ref 21–31)
SAO2 % BLDCOA: ABNORMAL %
SAO2 % BLDCOV: 94.7 %
TOTAL RATE: 0 BREATHS/MINUTE
VENTILATOR MODE: ABNORMAL

## 2022-03-14 PROCEDURE — 82805 BLOOD GASES W/O2 SATURATION: CPT

## 2022-03-14 PROCEDURE — 59025 FETAL NON-STRESS TEST: CPT

## 2022-03-14 PROCEDURE — 0 MORPHINE PER 10 MG: Performed by: ANESTHESIOLOGY

## 2022-03-14 PROCEDURE — 85014 HEMATOCRIT: CPT | Performed by: OBSTETRICS & GYNECOLOGY

## 2022-03-14 PROCEDURE — 25010000002 CEFAZOLIN IN DEXTROSE 2-4 GM/100ML-% SOLUTION: Performed by: ANESTHESIOLOGY

## 2022-03-14 PROCEDURE — 25010000002 KETOROLAC TROMETHAMINE PER 15 MG: Performed by: OBSTETRICS & GYNECOLOGY

## 2022-03-14 PROCEDURE — C1889 IMPLANT/INSERT DEVICE, NOC: HCPCS | Performed by: OBSTETRICS & GYNECOLOGY

## 2022-03-14 PROCEDURE — 25010000002 PHENYLEPHRINE 10 MG/ML SOLUTION: Performed by: ANESTHESIOLOGY

## 2022-03-14 PROCEDURE — 25010000002 METOCLOPRAMIDE PER 10 MG: Performed by: ANESTHESIOLOGY

## 2022-03-14 PROCEDURE — 85018 HEMOGLOBIN: CPT | Performed by: OBSTETRICS & GYNECOLOGY

## 2022-03-14 PROCEDURE — 0 MAGNESIUM SULFATE 20 GM/500ML SOLUTION: Performed by: OBSTETRICS & GYNECOLOGY

## 2022-03-14 PROCEDURE — 88307 TISSUE EXAM BY PATHOLOGIST: CPT | Performed by: OBSTETRICS & GYNECOLOGY

## 2022-03-14 PROCEDURE — 59514 CESAREAN DELIVERY ONLY: CPT | Performed by: OBSTETRICS & GYNECOLOGY

## 2022-03-14 PROCEDURE — 25010000002 FENTANYL CITRATE (PF) 50 MCG/ML SOLUTION: Performed by: ANESTHESIOLOGY

## 2022-03-14 PROCEDURE — 25010000002 ONDANSETRON PER 1 MG: Performed by: ANESTHESIOLOGY

## 2022-03-14 DEVICE — HEMOST ABS SURGICEL SNOW 4X4IN: Type: IMPLANTABLE DEVICE | Site: UTERUS | Status: FUNCTIONAL

## 2022-03-14 RX ORDER — OXYCODONE HYDROCHLORIDE 5 MG/1
5 TABLET ORAL EVERY 4 HOURS PRN
Status: DISCONTINUED | OUTPATIENT
Start: 2022-03-14 | End: 2022-03-14 | Stop reason: HOSPADM

## 2022-03-14 RX ORDER — ACETAMINOPHEN 325 MG/1
650 TABLET ORAL EVERY 6 HOURS
Status: DISCONTINUED | OUTPATIENT
Start: 2022-03-15 | End: 2022-03-14 | Stop reason: HOSPADM

## 2022-03-14 RX ORDER — HYDROCORTISONE 25 MG/G
1 CREAM TOPICAL AS NEEDED
Status: DISCONTINUED | OUTPATIENT
Start: 2022-03-14 | End: 2022-03-14 | Stop reason: HOSPADM

## 2022-03-14 RX ORDER — CARBOPROST TROMETHAMINE 250 UG/ML
250 INJECTION, SOLUTION INTRAMUSCULAR AS NEEDED
Status: DISCONTINUED | OUTPATIENT
Start: 2022-03-14 | End: 2022-03-14 | Stop reason: HOSPADM

## 2022-03-14 RX ORDER — ACETAMINOPHEN 500 MG
1000 TABLET ORAL EVERY 6 HOURS
Status: DISCONTINUED | OUTPATIENT
Start: 2022-03-14 | End: 2022-03-14 | Stop reason: HOSPADM

## 2022-03-14 RX ORDER — CALCIUM CARBONATE 200(500)MG
1 TABLET,CHEWABLE ORAL EVERY 4 HOURS PRN
Status: DISCONTINUED | OUTPATIENT
Start: 2022-03-14 | End: 2022-03-14 | Stop reason: HOSPADM

## 2022-03-14 RX ORDER — BUPIVACAINE HYDROCHLORIDE 7.5 MG/ML
INJECTION, SOLUTION INTRASPINAL
Status: COMPLETED | OUTPATIENT
Start: 2022-03-14 | End: 2022-03-14

## 2022-03-14 RX ORDER — SODIUM CHLORIDE, SODIUM LACTATE, POTASSIUM CHLORIDE, CALCIUM CHLORIDE 600; 310; 30; 20 MG/100ML; MG/100ML; MG/100ML; MG/100ML
INJECTION, SOLUTION INTRAVENOUS CONTINUOUS PRN
Status: DISCONTINUED | OUTPATIENT
Start: 2022-03-14 | End: 2022-03-14 | Stop reason: SURG

## 2022-03-14 RX ORDER — PHENYLEPHRINE HYDROCHLORIDE 10 MG/ML
INJECTION INTRAVENOUS AS NEEDED
Status: DISCONTINUED | OUTPATIENT
Start: 2022-03-14 | End: 2022-03-14 | Stop reason: SURG

## 2022-03-14 RX ORDER — METHYLERGONOVINE MALEATE 0.2 MG/ML
200 INJECTION INTRAVENOUS AS NEEDED
Status: DISCONTINUED | OUTPATIENT
Start: 2022-03-14 | End: 2022-03-14 | Stop reason: HOSPADM

## 2022-03-14 RX ORDER — FAMOTIDINE 10 MG/ML
INJECTION, SOLUTION INTRAVENOUS AS NEEDED
Status: DISCONTINUED | OUTPATIENT
Start: 2022-03-14 | End: 2022-03-14 | Stop reason: SURG

## 2022-03-14 RX ORDER — IBUPROFEN 600 MG/1
600 TABLET ORAL EVERY 6 HOURS
Status: DISCONTINUED | OUTPATIENT
Start: 2022-03-15 | End: 2022-03-14 | Stop reason: HOSPADM

## 2022-03-14 RX ORDER — SODIUM CHLORIDE 0.9 % (FLUSH) 0.9 %
3-10 SYRINGE (ML) INJECTION AS NEEDED
Status: DISCONTINUED | OUTPATIENT
Start: 2022-03-14 | End: 2022-03-14 | Stop reason: HOSPADM

## 2022-03-14 RX ORDER — METOCLOPRAMIDE HYDROCHLORIDE 5 MG/ML
INJECTION INTRAMUSCULAR; INTRAVENOUS AS NEEDED
Status: DISCONTINUED | OUTPATIENT
Start: 2022-03-14 | End: 2022-03-14 | Stop reason: SURG

## 2022-03-14 RX ORDER — OXYCODONE HYDROCHLORIDE 5 MG/1
10 TABLET ORAL EVERY 4 HOURS PRN
Status: DISCONTINUED | OUTPATIENT
Start: 2022-03-14 | End: 2022-03-14 | Stop reason: HOSPADM

## 2022-03-14 RX ORDER — SODIUM CHLORIDE 0.9 % (FLUSH) 0.9 %
3 SYRINGE (ML) INJECTION EVERY 12 HOURS SCHEDULED
Status: DISCONTINUED | OUTPATIENT
Start: 2022-03-14 | End: 2022-03-14 | Stop reason: HOSPADM

## 2022-03-14 RX ORDER — DOCUSATE SODIUM 100 MG/1
100 CAPSULE, LIQUID FILLED ORAL 2 TIMES DAILY PRN
Status: DISCONTINUED | OUTPATIENT
Start: 2022-03-14 | End: 2022-03-14 | Stop reason: HOSPADM

## 2022-03-14 RX ORDER — PROMETHAZINE HYDROCHLORIDE 12.5 MG/1
12.5 TABLET ORAL EVERY 4 HOURS PRN
Status: DISCONTINUED | OUTPATIENT
Start: 2022-03-14 | End: 2022-03-14 | Stop reason: HOSPADM

## 2022-03-14 RX ORDER — ALUMINA, MAGNESIA, AND SIMETHICONE 2400; 2400; 240 MG/30ML; MG/30ML; MG/30ML
15 SUSPENSION ORAL EVERY 4 HOURS PRN
Status: DISCONTINUED | OUTPATIENT
Start: 2022-03-14 | End: 2022-03-14 | Stop reason: HOSPADM

## 2022-03-14 RX ORDER — PRENATAL VIT/IRON FUM/FOLIC AC 27MG-0.8MG
1 TABLET ORAL DAILY
Status: DISCONTINUED | OUTPATIENT
Start: 2022-03-14 | End: 2022-03-14 | Stop reason: SDUPTHER

## 2022-03-14 RX ORDER — HYDROXYZINE HYDROCHLORIDE 25 MG/1
50 TABLET, FILM COATED ORAL EVERY 6 HOURS PRN
Status: DISCONTINUED | OUTPATIENT
Start: 2022-03-14 | End: 2022-03-14 | Stop reason: HOSPADM

## 2022-03-14 RX ORDER — MISOPROSTOL 200 UG/1
600 TABLET ORAL AS NEEDED
Status: DISCONTINUED | OUTPATIENT
Start: 2022-03-14 | End: 2022-03-14 | Stop reason: HOSPADM

## 2022-03-14 RX ORDER — CEFAZOLIN SODIUM 2 G/100ML
INJECTION, SOLUTION INTRAVENOUS AS NEEDED
Status: DISCONTINUED | OUTPATIENT
Start: 2022-03-14 | End: 2022-03-14 | Stop reason: SURG

## 2022-03-14 RX ORDER — FENTANYL CITRATE 50 UG/ML
INJECTION, SOLUTION INTRAMUSCULAR; INTRAVENOUS
Status: COMPLETED | OUTPATIENT
Start: 2022-03-14 | End: 2022-03-14

## 2022-03-14 RX ORDER — SIMETHICONE 80 MG
80 TABLET,CHEWABLE ORAL 4 TIMES DAILY PRN
Status: DISCONTINUED | OUTPATIENT
Start: 2022-03-14 | End: 2022-03-14 | Stop reason: HOSPADM

## 2022-03-14 RX ORDER — ONDANSETRON 2 MG/ML
INJECTION INTRAMUSCULAR; INTRAVENOUS AS NEEDED
Status: DISCONTINUED | OUTPATIENT
Start: 2022-03-14 | End: 2022-03-14 | Stop reason: SURG

## 2022-03-14 RX ORDER — KETOROLAC TROMETHAMINE 15 MG/ML
15 INJECTION, SOLUTION INTRAMUSCULAR; INTRAVENOUS EVERY 6 HOURS
Status: DISCONTINUED | OUTPATIENT
Start: 2022-03-14 | End: 2022-03-14 | Stop reason: HOSPADM

## 2022-03-14 RX ORDER — KETOROLAC TROMETHAMINE 30 MG/ML
30 INJECTION, SOLUTION INTRAMUSCULAR; INTRAVENOUS ONCE
Status: COMPLETED | OUTPATIENT
Start: 2022-03-14 | End: 2022-03-14

## 2022-03-14 RX ORDER — MORPHINE SULFATE 0.5 MG/ML
INJECTION, SOLUTION EPIDURAL; INTRATHECAL; INTRAVENOUS
Status: COMPLETED | OUTPATIENT
Start: 2022-03-14 | End: 2022-03-14

## 2022-03-14 RX ADMIN — PHENYLEPHRINE HYDROCHLORIDE 300 MCG: 10 INJECTION, SOLUTION INTRAVENOUS at 05:33

## 2022-03-14 RX ADMIN — Medication 1 APPLICATION: at 12:38

## 2022-03-14 RX ADMIN — ONDANSETRON 4 MG: 2 INJECTION INTRAMUSCULAR; INTRAVENOUS at 05:31

## 2022-03-14 RX ADMIN — KETOROLAC TROMETHAMINE 15 MG: 15 INJECTION, SOLUTION INTRAMUSCULAR; INTRAVENOUS at 18:01

## 2022-03-14 RX ADMIN — ACETAMINOPHEN 1000 MG: 500 TABLET ORAL at 09:47

## 2022-03-14 RX ADMIN — CEFAZOLIN SODIUM 2 G: 2 INJECTION, SOLUTION INTRAVENOUS at 05:15

## 2022-03-14 RX ADMIN — SODIUM CHLORIDE, POTASSIUM CHLORIDE, SODIUM LACTATE AND CALCIUM CHLORIDE 999 ML/HR: 600; 310; 30; 20 INJECTION, SOLUTION INTRAVENOUS at 04:55

## 2022-03-14 RX ADMIN — KETOROLAC TROMETHAMINE 15 MG: 15 INJECTION, SOLUTION INTRAMUSCULAR; INTRAVENOUS at 12:37

## 2022-03-14 RX ADMIN — SODIUM CHLORIDE, POTASSIUM CHLORIDE, SODIUM LACTATE AND CALCIUM CHLORIDE: 600; 310; 30; 20 INJECTION, SOLUTION INTRAVENOUS at 04:57

## 2022-03-14 RX ADMIN — ACETAMINOPHEN 1000 MG: 500 TABLET ORAL at 15:13

## 2022-03-14 RX ADMIN — MAGNESIUM SULFATE IN WATER 2 G/HR: 40 INJECTION, SOLUTION INTRAVENOUS at 03:24

## 2022-03-14 RX ADMIN — BUPIVACAINE HYDROCHLORIDE IN DEXTROSE 1.5 ML: 7.5 INJECTION, SOLUTION SUBARACHNOID at 05:28

## 2022-03-14 RX ADMIN — METOCLOPRAMIDE 10 MG: 5 INJECTION, SOLUTION INTRAMUSCULAR; INTRAVENOUS at 05:31

## 2022-03-14 RX ADMIN — MORPHINE SULFATE 0.3 MG: 0.5 INJECTION, SOLUTION EPIDURAL; INTRATHECAL; INTRAVENOUS at 05:28

## 2022-03-14 RX ADMIN — KETOROLAC TROMETHAMINE 30 MG: 30 INJECTION, SOLUTION INTRAMUSCULAR; INTRAVENOUS at 07:22

## 2022-03-14 RX ADMIN — FAMOTIDINE 20 MG: 10 INJECTION, SOLUTION INTRAVENOUS at 05:31

## 2022-03-14 RX ADMIN — SODIUM CHLORIDE, POTASSIUM CHLORIDE, SODIUM LACTATE AND CALCIUM CHLORIDE 30 ML/HR: 600; 310; 30; 20 INJECTION, SOLUTION INTRAVENOUS at 03:28

## 2022-03-14 RX ADMIN — FENTANYL CITRATE 25 MCG: 50 INJECTION, SOLUTION INTRAMUSCULAR; INTRAVENOUS at 05:28

## 2022-03-14 NOTE — ANESTHESIA POSTPROCEDURE EVALUATION
Patient: Ladonna Gil    Procedure Summary     Date: 22 Room / Location: [facility] LABOR DELIVERY 3 /  [facility]    Anesthesia Start: 518 Anesthesia Stop: 615    Procedure:  SECTION PRIMARY (N/A Abdomen) Diagnosis:     Surgeons: Salma Cohn MD Provider: Matt Alvarez MD    Anesthesia Type: spinal ASA Status: 2 - Emergent          Anesthesia Type: spinal    Vitals  Vitals Value Taken Time   /48 22 0610   Temp     Pulse 73 22 0614   Resp     SpO2 94 % 22 0613   Vitals shown include unvalidated device data.        Post Anesthesia Care and Evaluation    Patient location during evaluation: bedside  Patient participation: complete - patient participated  Level of consciousness: awake and alert  Pain score: 0  Pain management: adequate  Airway patency: patent  Anesthetic complications: No anesthetic complications    Cardiovascular status: acceptable  Respiratory status: acceptable  Hydration status: acceptable

## 2022-03-14 NOTE — PROGRESS NOTES
CTSP secondary to concerns of fetal heart rate tracing. Has had four large decelerations lasting over 2 minutes in the last hour. Variability is beginning to decrease as well to minimal. There are no accels.   Patient is not hurting, and has had no bleeding. We are 18 hour post BMZ #2.   Discussed with M Dr. Milligan who agrees with plan to proceed with delivery secondary to worsening fetal tracing.   Disscused with patient and her spouse who are both in agreement.     Salma Cohn MD  3/14/2022 4038

## 2022-03-14 NOTE — ANESTHESIA PROCEDURE NOTES
Spinal Block      Patient reassessed immediately prior to procedure    Patient location during procedure: OR  Start Time: 3/14/2022 5:28 AM  Stop Time: 3/14/2022 5:28 AM  Indication:at surgeon's request  Performed By  Anesthesiologist: Matt Alvarez MD  Preanesthetic Checklist  Completed: patient identified, IV checked, site marked, risks and benefits discussed, surgical consent, monitors and equipment checked, pre-op evaluation and timeout performed  Spinal Block Prep:  Patient Position:sitting  Sterile Tech:cap, gloves and sterile barriers  Prep:Betadine  Patient Monitoring:EKG, continuous pulse oximetry and blood pressure monitoring  Spinal Block Procedure  Approach:midline  Guidance:palpation technique  Location:L2-L3  Needle Type:Jcarlos  Needle Gauge:25 G  Placement of Spinal needle event:cerebrospinal fluid aspirated  Paresthesia: no  Fluid Appearance:clear  Medications: bupivacaine in dextrose (MARCAINE SPINAL / Hyberbaric) 0.75-8.25 % injection, 1.5 mL  morphine PF (ASTRAMORPH) injection, 0.3 mg  fentaNYL citrate (PF) (SUBLIMAZE) injection, 25 mcg  Med Administered at 3/14/2022 5:28 AM   Post Assessment  Patient Tolerance:patient tolerated the procedure well with no apparent complications  Complications no

## 2022-03-14 NOTE — LACTATION NOTE
03/14/22 1300   Maternal Information   Date of Referral 03/14/22   Person Making Referral lactation consultant   Maternal Reason for Referral separation from infant;maternal age   Infant Reason for Referral NICU admission;separation from mother;low birth weight   Maternal Assessment   Breast Size Issue none   Breast Shape Bilateral:;round;wide   Breast Density Bilateral:;soft   Nipples Bilateral:;everted   Left Nipple Symptoms intact;nontender   Right Nipple Symptoms intact;nontender   Maternal Infant Feeding   Maternal Emotional State receptive   Additional Documentation Breastfeeding Supplementation (Group)   Breastfeeding Supplementation   Maternal Indication for Supplementation separation from infant   Infant Indication for Supplementation prematurity   Milk Expression/Equipment   Breast Pump Type double electric, hospital grade   Breast Pump Flange Type hard   Breast Pump Flange Size 24 mm;21 mm  (started at 24mm; 21mm provided)   Equipment for Home Use other (see comments)  (pt reports having a breast pump at home)   Breast Pumping   Breast Pumping Interventions early pumping promoted;frequent pumping encouraged   Breast Pumping double electric breast pump utilized

## 2022-03-14 NOTE — CASE MANAGEMENT/SOCIAL WORK
Continued Stay Note  [facility]     Patient Name: Ladonna Gil  MRN: [id number]  Today's Date: 3/14/2022    Admit Date: 3/12/2022     Discharge Plan     Row Name 03/14/22 1550       Plan    Plan MSW available    Plan Comments Spoke with pt over the phone due to COVID precautions. Pt has h/o anxiety. Discussed increased risk for PPD. Pt verbalized her current stressors and worries. She states she will reach out to her provider if she  needs. Provided info on PPD to RN to give to pt. Ambulance transport rescheduled for 18:30 this evening.    Final Discharge Disposition Code 02 - short term hospital for  care               Discharge Codes    No documentation.               Expected Discharge Date and Time     Expected Discharge Date Expected Discharge Time    Mar 14, 2022             ZACH Romero

## 2022-03-14 NOTE — OP NOTE
[facility]   Section Operative Note    Pre-Operative Dx:   1.  IUP at Gestational Age: 30w0d  weeks    2. PPROM  3. BREECH presentation  4. Recurrent fetal heart rate decelerations with minimal variability         Postoperative dx:    1.  Same     Procedure: Procedure(s):   SECTION PRIMARY, CLASSICAL UTERINE INCISION   Surgeon/Assistant: Surgeon(s):  Salma Cohn MD O'Broin, Seamus, MD Assistant         Anesthesia:  Anesthesiologist: Choice  Anesthesiologist: Matt Alvarez MD         QBL: 544 ml            UOP: 200 mls.       Antibiotics: cefazolin (Ancef) 2g     Infant:           Gender: male  infant    Weight: 1370 g (3 lb 0.3 oz)     Apgars:   2@ 1 minute /       7@ 5 minutes    Cord gases: Venous:    pH, Cord Venous   Date Value Ref Range Status   2022 7.445 (H) 7.310 - 7.370 pH Units Final        Arterial:  No results found for: PHCART     Indication for C/Section: -0-1-0 at 30 weeks and 0 days with PPROM x3 days and a fetus in breech presentation.  She is status post  corticosteroids with a worsening fetal heart rate tracing with recurrent prolonged decelerations and minimal variability.     Priority for C/Section:  emergency      Procedure Details:   The patient was taken to the operating room after risks and benefits have been reviewed. The consent was reviewed and had been signed. Time Out was performed. She was prepped and draped in the normal sterile fashion in the dorsal supine position and a leftward tilt.  A Pfannenstiel skin incision made 2 cm above the pubic symphysis and carried down to the underlying layer of fascia with the scalpel.  The fascia was nicked in the midline and the incision was extended laterally with Zarate scissors.  The anterior aspect of the incision was grasped with Kocher clamps x2 and elevated and the underlying rectus muscles were dissected off sharply and bluntly.  The inferior aspect of the incision was treated similarly.  The  peritoneum was identified and entered bluntly the incision was extended with lateral traction.  A bladder blade was inserted.  The uterus was  incised vertically with the scalpel.  The uterine cavity was entered bluntly and the incision was extended with bandage scissors.  The fetus was then delivered atraumatically using the usual breech maneuvers.  Cord was clamped and cut and the infant was handed off to NICU staff for evaluation.  Cord blood and cord gases were obtained.  The placenta was delivered via uterine massage.  The uterus was then exteriorized and cleared of all clot and debris with clean laparotomy sponges.  Uterus was found to be arcuate with 2 fibroids posteriorly the largest of which was 4 cm.  The hysterotomy was closed in 3 layers with a #1 chromic in a running locking fashion.  Followed by an imbricating stitch of the same suture, hemostasis was achieved with multiple figure-of-eight's.  The serosa was closed with a 2-0 chromic in a running locking stitch the posterior cul-de-sac was irrigated and aspirated.  The hysterotomy was again inspected and noted to be hemostatic.  The uterus was returned to the abdomen.  The paracolic gutters were irrigated and aspirated.  Hysterotomy was inspected for third time and noted to be hemostatic.  Surgicel powder was placed over the incision.  The peritoneum was closed with 2-0 chromic in a running fashion.  The rectus muscles underlying the fascia were made hemostatic with Bovie electrocautery.  The fascia was then closed with 0 Vicryl in a running stitch.  The subcutaneous tissues were irrigated aspirated and made hemostatic with Bovie electrocautery.  The subcutaneous tissues were reapproximated with 3-0 Vicryl and the skin was closed with 4-0 Monocryl in a subcuticular fashion and sealed with Dermabond.  All counts were correct and the patient was taken to the recovery room in stable condition.       Complications:   None      Disposition:   Mother to  Mother Baby/Postpartum  in stable condition currently.   Baby to NICU  in stable condition currently.      Dr. Jagjit Chilel   was responsible for performing the following activities: Retraction, Suction and Irrigation and their skilled assistance was necessary for the success of this case.    Salma Cohn MD  3/14/2022  06:24 EDT

## 2022-03-14 NOTE — CASE MANAGEMENT/SOCIAL WORK
Continued Stay Note  [facility]     Patient Name: Ladonna Gil  MRN: [id number]  Today's Date: 3/14/2022    Admit Date: 3/12/2022     Discharge Plan     Row Name 03/14/22 1114       Plan    Plan ambulance transfer to  at 17:30    Plan Comments Ambulance transfer scheduled for today at 17:30 with [facility] ambulance ext [phone] for trasnfer to [facility]    Final Discharge Disposition Code 02 - short term hospital for  care               Discharge Codes    No documentation.                     ZACH Romero

## 2022-03-14 NOTE — DISCHARGE SUMMARY
[facility]  Discharge Summary/Transfer Summary      Patient: Ladonna Gil            : 1980  MRN: [id number]  CSN: [id number]  Consult:   Consults     No orders found from 2022 to 3/13/2022.             Gestational Age at Discharge:  30w0d      Admission  Diagnosis: PPROM   Discharge Diagnosis:   Patient Active Problem List   Diagnosis   • History of  premature rupture of membranes (PPROM)   •  premature rupture of membranes (PPROM) with unknown onset of labor       Date of Admission: 3/12/2022    Date of Transfer:   3/14/2022    Procedures:  Primary Classical  Section           Service:  Obstetrics    Hospital Course: Patient is a 42-year-old -0-1-0 who presented at 29 weeks and 6 days with grossly ruptured membranes occurring at 8 PM on 3/12/22.  Her pregnancy had been previously uncomplicated other than advanced maternal age.  She was admitted to the hospital given  corticosteroids, magnesium sulfate, and latency antibiotics.  She was not found to be laboring but her fetus was breech presentation.  On PPROM day #3 the fetal heart rate tracing began to have recurrent prolonged decelerations in the setting of minimal variability.  Decision was made to proceed with primary  section due to a nonreassuring fetal heart tracing.  Patient was delivered on 3/14/2022 via primary classical  section due to nondistention of the lower uterine segment.  The procedure was uncomplicated please see the operative note for full detail on the procedure.  She delivered a viable male infant with Apgars of 2 and 7 weighing 3 pounds 0.3 ounces or 1370 g.  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  Patient was stable postoperatively and compassionate care transfer was secured for her on postop day 0.  Of note she did test positive for COVID-19 on 3/4/2022.  But  is currently asymptomatic and had been released from quarantine as of 3/14/2022.      Labs:    Lab Results (last 24 hours)     Procedure Component Value Units Date/Time    Blood Gas, Venous, Cord [[id number]]  (Abnormal) Collected: 03/14/22 0608    Specimen: Cord Blood Venous from Umbilical Cord Updated: 03/14/22 0609     Site Umbilical     pH, Cord Venous 7.445 pH Units      pCO2, Cord Venous 27.8 mm Hg      Comment: 85 Value below critical limit        pO2, Cord Venous 58.5 mm Hg      HCO3, Cord Venous 19.1 mmol/L      Base Excess, Cord Venous -3.4 mmol/L      O2 Sat, Cord Venous 94.7 %      Hemoglobin, Blood Gas 15.6 g/dL      CO2 Content 19.9 mmol/L      Temperature 37.0 C      Barometric Pressure for Blood Gas --     Comment: N/A        Modality Room Air     FIO2 21 %      Ventilator Mode       Rate 0 Breaths/minute      PIP 0 cmH2O      Comment: Meter: [id number]     :  [id number]        IPAP 0     EPAP 0     O2 Saturation Calculated --     Comment: Calculated O2 saturation result not reported at this site.        Note --     Notified Duncan BORGES RN     Notified By [id number]     Notified Time 03/14/2022 05:17     Collection Time --        HOSPITAL LABS:  Lab Results   Component Value Date    WBC 10.96 (H) 03/12/2022    HGB 12.1 03/12/2022    HCT 33.3 (L) 03/12/2022    MCV 90.0 03/12/2022     03/12/2022    URICACID 2.7 03/12/2022    AST 22 03/12/2022    ALT 28 03/12/2022     (H) 03/12/2022     Results from last 7 days   Lab Units 03/12/22 2140   ABO TYPING  O   RH TYPING  Positive   ANTIBODY SCREEN  Negative       IMAGING        Discharge Medications     Discharge Medications      ASK your doctor about these medications      Instructions Start Date   aspirin 81 MG chewable tablet   81 mg, Oral, Daily      cetirizine 10 MG tablet  Commonly known as: zyrTEC   10 mg, Oral, Daily      fluticasone 50 MCG/ACT nasal spray  Commonly known as: FLONASE   2 sprays, Nasal, Daily      PrePLUS 27-1  MG tablet   No dose, route, or frequency recorded.      sertraline 50 MG tablet  Commonly known as: ZOLOFT   75 mg, Oral, Daily      Vitamin D (Cholecalciferol) 10 MCG (400 UNIT) tablet  Commonly known as: CHOLECALCIFEROL   400 Units, Oral, Daily             Allergies: No Known Allergies     Discharge Disposition:  To Home    Discharge Condition:  Stable    Discharge Diet: Regular    Activity at Discharge:  Pelvic rest, no lifting, showers only, no driving while on pain medications    Follow-up Appointments: March 29 at 2 PM with Salma Cohn MD  03/14/22  10:14 EDT

## 2022-03-14 NOTE — ANESTHESIA POSTPROCEDURE EVALUATION
Patient: Ladonna Gil    Procedure Summary     Date: 22 Room / Location: [facility] LABOR DELIVERY 3 /  [facility]OR DELIVERY    Anesthesia Start: 518 Anesthesia Stop: 615    Procedure:  SECTION PRIMARY (N/A Abdomen) Diagnosis:     Surgeons: Salma Cohn MD Provider: Matt Alvarez MD    Anesthesia Type: spinal ASA Status: 2 - Emergent          Anesthesia Type: spinal    Vitals  Vitals Value Taken Time   BP 99/62 22 0815   Temp 97.7 °F (36.5 °C) 22 0815   Pulse 63 22 0815   Resp 18 22 0815   SpO2 98 % 22 0815           Post Anesthesia Care and Evaluation    Patient location during evaluation: bedside  Patient participation: complete - patient participated  Level of consciousness: awake and alert  Pain management: adequate  Airway patency: patent  Anesthetic complications: No anesthetic complications    Cardiovascular status: acceptable  Respiratory status: acceptable  Hydration status: acceptable  Post Neuraxial Block status: Motor and sensory function returned to baseline and No signs or symptoms of PDPH

## 2022-03-14 NOTE — ANESTHESIA PREPROCEDURE EVALUATION
Anesthesia Evaluation     Patient summary reviewed and Nursing notes reviewed                Airway   Mallampati: I  TM distance: >3 FB  Neck ROM: full  No difficulty expected  Dental - normal exam     Pulmonary - negative pulmonary ROS and normal exam   Cardiovascular - negative cardio ROS and normal exam        Neuro/Psych- negative ROS  GI/Hepatic/Renal/Endo - negative ROS     Musculoskeletal (-) negative ROS    Abdominal  - normal exam    Bowel sounds: normal.   Substance History - negative use     OB/GYN    (+) Pregnant,         Other                        Anesthesia Plan    ASA 2 - emergent     spinal       Anesthetic plan, all risks, benefits, and alternatives have been provided, discussed and informed consent has been obtained with: patient.  Use of blood products discussed with patient .   Plan discussed with attending.        CODE STATUS:    Level Of Support Discussed With: Patient  Code Status (Patient has no pulse and is not breathing): CPR (Attempt to Resuscitate)  Medical Interventions (Patient has pulse or is breathing): Full Support

## 2022-03-14 NOTE — BRIEF OP NOTE
SECTION PRIMARY  Progress Note    Ladonna Gil  3/14/2022    Pre-op Diagnosis:   IUP @ 30w0d  PPROM  Recurrent Fetal heart rate decelerations  BREECH Presentation       Post-Op Diagnosis Codes:  IUP @ 30w0d  PPROM  Recurrent Fetal heart rate decelerations  BREECH Presentation    Procedure/CPT® Codes:        Procedure(s):   SECTION PRIMARY, Classical uterine incision    Surgeon(s):  Salma Cohn MD O'Broin, Seamus, MD    Anesthesia: Choice    Staff:   Circulator: Dariel Lema RN  Scrub Person: Monica Mohan PCT  NICU Nurse: Lois Doll RN  Baby Nurse: Susana Hope RN         Estimated Blood Loss: <500ml    Urine Voided: 700 mL    Specimens:                A: Placenta          Drains: * No LDAs found *    Findings: VMI in the valentina Breech presentation, Apgars and weight pending.   Arcuate uterus, with 2 posterior fibroids, largest 4 cm in width.   Normal tubes and ovaries  15-20% Abruption    Complications: None          Salma Cohn MD     Date: 3/14/2022  Time: 06:11 EDT

## 2022-03-14 NOTE — NURSING NOTE
Reviewed fetal monitor strip with Dariel (primary nurse). Low fetal baseline (105) with moderate variabilty with accelerations noted. Patient is on Magnesium Sulfate Drip @ 2 grams/hour.  The nurse will continue to monitor the strip closely and will notify her doctor as needed. Dr. Chilel notified of status of fetal monitor tracing. No change in plan of care at this time.

## 2022-03-15 LAB
CYTO UR: NORMAL
LAB AP CASE REPORT: NORMAL
LAB AP CLINICAL INFORMATION: NORMAL
PATH REPORT.FINAL DX SPEC: NORMAL
PATH REPORT.GROSS SPEC: NORMAL

## 2022-03-15 NOTE — PAYOR COMM NOTE
"Elaine Gil (42 y.o. Female)     Auth#[id number]    Transferred to [facility] 3/14/22.    Needs 1 additional day approved for [facility].    From: Leslie Mijares LPN, Utilization Review  Phone #[phone]  Fax #[phone]              Date of Birth   1980    Social Security Number       Address   [address] [place] [address]    Home Phone   [phone]    MRN   [id number]       [employer]ybeterian   Unknown    Marital Status                               Admission Date   3/12/22    Admission Type   Elective    Admitting Provider   Salma Cohn MD    Attending Provider       Department, Room/Bed   [facility] MOTHER BABY 4A, N413/1       Discharge Date   3/14/2022    Discharge Disposition   Another Health Care Institution Not Defined    Discharge Destination                               Attending Provider: (none)   Allergies: No Known Allergies    Isolation: None   Infection: None   Code Status: Prior   Advance Care Planning Activity    Ht: 162.6 cm (64\")   Wt: 88.5 kg (195 lb)    Admission Cmt: None   Principal Problem: None                Active Insurance as of 3/12/2022     Primary Coverage     Payor Plan Insurance Group Employer/Plan Group    ANTHEM BLUE CROSS ANTHEM BLUE CROSS BLUE SHIELD PPO [id number]     Payor Plan Address Payor Plan Phone Number Payor Plan Fax Number Effective Dates    [address] [phone]  2016 - None Entered    [place] [address]       Subscriber Name Subscriber Birth Date Member ID       ELAINE GIL 1980 [id number]                 Emergency Contacts      (Rel.) Home Phone Work Phone Mobile Phone    SORAYA GIL (Spouse) -- -- [phone]               History & Physical      Jagjit Chilel MD at 22 0021          [facility]  Obstetric History and Physical    Chief Complaint   Patient presents with   • Rupture of Membranes       HPI:    Patient is a 42 y.o. female  currently at 29w6d, " who presents with grossly ruptured membranes that occurred around 2000 hrs.  She says she has up to this point had an uneventful pregnancy including a normal anatomy scan at 20 weeks.   She denies vaginal bleeding prior to arrival, but has past a small clot since being here.   +FM.   She is starting to feel more contractions and it seem to be getting worse even after the Magnesium was started.    She denies fever or chills.  She denies uterine tenderness other than the contractions.         Prenatal Information:  Prenatal Results     POC Urine Glucose/Protein     Test Value Reference Range Date Time    Urine Glucose        Urine Protein              Initial Prenatal Labs     Test Value Reference Range Date Time    Hemoglobin  12.1 g/dL 12.0 - 15.9 03/12/22 2140       12.8 g/dL 12.0 - 15.9 02/08/22 0954       13.2 g/dL 12.0 - 15.9 11/10/21 1702    Hematocrit  33.3 % 34.0 - 46.6 03/12/22 2140       36.6 % 34.0 - 46.6 02/08/22 0954       39.7 % 34.0 - 46.6 11/10/21 1702    Platelets  302 10*3/mm3 140 - 450 03/12/22 2140       309 10*3/mm3 140 - 450 02/08/22 0954       314 10*3/mm3 140 - 450 11/10/21 1702    Rubella IgG  14.90 index Immune >0.99 11/10/21 1701    Hepatitis B SAg  Non-Reactive  Non-Reactive 11/10/21 1702    Hepatitis C Ab  Non-Reactive  Non-Reactive 11/10/21 1701    RPR  Non-Reactive  Non-Reactive 11/10/21 1701    ABO  O   03/12/22 2140    Rh  Positive   03/12/22 2140    Antibody Screen  Negative   03/12/22 2140       Negative   02/08/22 0954       Negative   11/10/21 1701    HIV  Non-Reactive  Non-Reactive 11/10/21 1701    Urine Culture        Gonorrhea        Chlamydia        TSH  1.710 uIU/mL 0.270 - 4.200 11/10/21 1701    HgB A1c               2nd and 3rd Trimester     Test Value Reference Range Date Time    Hemoglobin (repeated)  12.1 g/dL 12.0 - 15.9 03/12/22 2140       12.8 g/dL 12.0 - 15.9 02/08/22 0954    Hematocrit (repeated)  33.3 % 34.0 - 46.6 03/12/22 2140       36.6 % 34.0 - 46.6 02/08/22  0954    Platelets   302 10*3/mm3 140 - 450 03/12/22 2140       309 10*3/mm3 140 - 450 02/08/22 0954       314 10*3/mm3 140 - 450 11/10/21 1702    GCT  90 mg/dL 65 - 139 02/08/22 0954    Antibody Screen (repeated)  Negative   03/12/22 2140       Negative   02/08/22 0954    GTT Fasting  84 mg/dL 65 - 94 02/08/22 0954    GTT 1 Hr        GTT 2 Hr        GTT 3 Hr        Group B Strep              Drug Screening     Test Value Reference Range Date Time    Amphetamine Screen  Negative  Negative 11/10/21 1647    Barbiturate Screen  Negative  Negative 11/10/21 1647    Benzodiazepine Screen  Negative  Negative 11/10/21 1647    Methadone Screen  Negative  Negative 11/10/21 1647    Phencyclidine Screen  Negative  Negative 11/10/21 1647    Opiates Screen  Negative  Negative 11/10/21 1647    THC Screen  Negative  Negative 11/10/21 1647    Cocaine Screen  Negative  Negative 11/10/21 1647    Propoxyphene Screen  Negative  Negative 11/10/21 1647    Buprenorphine Screen  Negative  Negative 11/10/21 1647    Methamphetamine Screen  Negative  Negative 11/10/21 1647    Oxycodone Screen  Negative  Negative 11/10/21 1647    Tricyclic Antidepressants Screen  Negative  Negative 11/10/21 1647          Other (Risk screening)     Test Value Reference Range Date Time    Varicella IgG        Parvovirus IgG        CMV IgG        Cystic Fibrosis        Hemoglobin electrophoresis        NIPT        MSAFP-4        AFP (for NTD only)              Legend    ^: Historical                      External Prenatal Results     Pregnancy Outside Results - Transcribed From Office Records - See Scanned Records For Details     Test Value Date Time    ABO  O  03/12/22 2140    Rh  Positive  03/12/22 2140    Antibody Screen  Negative  03/12/22 2140       Negative  02/08/22 0954       Negative  11/10/21 1701    Varicella IgG       Rubella  14.90 index 11/10/21 1701    Hgb  12.1 g/dL 03/12/22 2140       12.8 g/dL 02/08/22 0954       13.2 g/dL 11/10/21 1702    Hct   33.3 % 22 2140       36.6 % 22 0954       39.7 % 11/10/21 1702    Glucose Fasting GTT  84 mg/dL 22 0954    Glucose Tolerance Test 1 hour       Glucose Tolerance Test 3 hour       Gonorrhea (discrete)       Chlamydia (discrete)       RPR  Non-Reactive  11/10/21 1701    VDRL       Syphilis Antibody       HBsAg  Non-Reactive  11/10/21 1702    Herpes Simplex Virus PCR       Herpes Simplex VIrus Culture       HIV  Non-Reactive  11/10/21 1701    Hep C RNA Quant PCR       Hep C Antibody  Non-Reactive  11/10/21 1701    AFP       Group B Strep       GBS Susceptibility to Clindamycin       GBS Susceptibility to Erythromycin       Fetal Fibronectin       Genetic Testing, Maternal Blood             Drug Screening     Test Value Date Time    Urine Drug Screen       Amphetamine Screen  Negative  11/10/21 1647    Barbiturate Screen  Negative  11/10/21 1647    Benzodiazepine Screen  Negative  11/10/21 1647    Methadone Screen  Negative  11/10/21 1647    Phencyclidine Screen  Negative  11/10/21 1647    Opiates Screen  Negative  11/10/21 1647    THC Screen  Negative  11/10/21 1647    Cocaine Screen       Propoxyphene Screen  Negative  11/10/21 1647    Buprenorphine Screen  Negative  11/10/21 1647    Methamphetamine Screen       Oxycodone Screen  Negative  11/10/21 1647    Tricyclic Antidepressants Screen  Negative  11/10/21 1647          Legend    ^: Historical                         Past OB History:     OB History    Para Term  AB Living   2 0 0 0 1 0   SAB IAB Ectopic Molar Multiple Live Births   1 0 0 0 0 0      # Outcome Date GA Lbr Colin/2nd Weight Sex Delivery Anes PTL Lv   2 Current            1 SAB 2020               Past Medical History: Past Medical History:   Diagnosis Date   • Anxiety    • Depression    • Miscarriage    • Seasonal allergies       Past Surgical History Past Surgical History:   Procedure Laterality Date   • WISDOM TOOTH EXTRACTION        Family History: Family History    Problem Relation Age of Onset   • No Known Problems Mother    • Hypertension Father    • Hypertension Sister    • No Known Problems Brother    • No Known Problems Maternal Grandmother    • No Known Problems Maternal Grandfather    • Diabetes Paternal Grandmother    • Breast cancer Paternal Grandmother    • Heart attack Paternal Grandfather       Social History:  reports that she has never smoked. She has never used smokeless tobacco.   reports no history of alcohol use.  Drug use questions deferred to the physician.        Review of Systems:  Denies chest pain and SOA.   All other pertinent positives and negatives are addressed in the HPI      Objective     Vital Signs Range for the last 24 hours  Temperature: Temp:  [98.2 °F (36.8 °C)] 98.2 °F (36.8 °C)   Temp Source: Temp src: Oral   BP: BP: (104-127)/(54-78) 106/54   Pulse: Heart Rate:  [65-76] 65   Respirations: Resp:  [16-18] 18   SPO2: SpO2:  [99 %-100 %] 99 %   O2 Amount (l/min):     O2 Devices     Weight: Weight:  [88.5 kg (195 lb)] 88.5 kg (195 lb)     Physical Examination: General appearance - oriented to person, place, and time.  She is anxious and appropriately concerned, but not in acute distress   Chest - CTAB  Heart - normal rate and regular rhythm, S1 and S2 normal, no murmurs noted  Abdomen - soft, nontender, nondistended, no masses or organomegaly  bowel sounds normal  Extremities - no pedal edema noted    Presentation: Deo Breech    Cervix: Exam by:     Dilation: Cervical Dilation (cm): 1   Effacement: Cervical Effacement: 70%   Station:  -2     Fetal Heart Rate Assessment   Method: Fetal HR Assessment Method: external   Beats/min: Fetal HR (beats/min): 125   Baseline: Fetal HR Baseline: normal range   Variability: Fetal HR Variability: moderate (amplitude range 6 to 25 bpm)   Accels: Fetal HR Accelerations: greater than/equal to 15 bpm, lasting at least 15 seconds   Decels: Fetal HR Decelerations: variable   Tracing Category:       Uterine  Assessment   Method: Method: external tocotransducer   Frequency (min):     Ctx Count in 10 min:     Duration:     Intensity:     Intensity by IUPC:     Resting Tone: Uterine Resting Tone: soft by palpation   Resting Tone by IUPC:     Collins Units:       Laboratory Results:    AmniSure   POSITIVE       Assessment/Plan       History of  premature rupture of membranes (PPROM)      Assessment & Plan    Assessment:  1.  Intrauterine pregnancy at 29w6d gestation with variable decelerations present fetal status.    2.  PPROM  3.   contractions  4.  Breech  5.  AMA     Plan:  1. Admit  2. IV, CBC, T&S, PEP  3. Magnesium bolus followed by currently 3gms/hr maintenance  4. NPO  5. BMZ x2  5. PPROM ABX  6. NICU consult  7. MFM consult        Jagjit Chilel MD  3/13/2022  00:21 EST    Electronically signed by Jagjit Chilel MD at 22 0029          Operative/Procedure Notes (last 72 hours)      Salma Cohn MD at 22 0536         SECTION PRIMARY  Progress Note    Ladonna Gil  3/14/2022    Pre-op Diagnosis:   IUP @ 30w0d  PPROM  Recurrent Fetal heart rate decelerations  BREECH Presentation       Post-Op Diagnosis Codes:  IUP @ 30w0d  PPROM  Recurrent Fetal heart rate decelerations  BREECH Presentation    Procedure/CPT® Codes:        Procedure(s):   SECTION PRIMARY, Classical uterine incision    Surgeon(s):  Salma Cohn MD O'Broin, Seamus, MD    Anesthesia: Choice    Staff:   Circulator: Dariel Lema RN  Scrub Person: Monica Mohan PCT  NICU Nurse: Lois Doll RN  Baby Nurse: Susana Hope RN         Estimated Blood Loss: <500ml    Urine Voided: 700 mL    Specimens:                A: Placenta          Drains: * No LDAs found *    Findings: VMI in the valentina Breech presentation, Apgars and weight pending.   Arcuate uterus, with 2 posterior fibroids, largest 4 cm in width.   Normal tubes and ovaries  15-20% Abruption    Complications:  None          Salma Cohn MD     Date: 3/14/2022  Time: 06:11 EDT        Electronically signed by Salma Cohn MD at 22 0613     Salma Cohn MD at 22 0536          [facility]   Section Operative Note    Pre-Operative Dx:   1.  IUP at Gestational Age: 30w0d  weeks    2. PPROM  3. BREECH presentation  4. Recurrent fetal heart rate decelerations with minimal variability         Postoperative dx:    1.  Same     Procedure: Procedure(s):   SECTION PRIMARY, CLASSICAL UTERINE INCISION   Surgeon/Assistant: Surgeon(s):  Salma Cohn MD O'Broin, Seamus, MD Assistant         Anesthesia:  Anesthesiologist: Choice  Anesthesiologist: Matt Alvarez MD         QBL: 544 ml            UOP: 200 mls.       Antibiotics: cefazolin (Ancef) 2g     Infant:           Gender: male  infant    Weight: 1370 g (3 lb 0.3 oz)     Apgars:   2@ 1 minute /       7@ 5 minutes    Cord gases: Venous:    pH, Cord Venous   Date Value Ref Range Status   2022 7.445 (H) 7.310 - 7.370 pH Units Final        Arterial:  No results found for: PHCART     Indication for C/Section: -0-1-0 at 30 weeks and 0 days with PPROM x3 days and a fetus in breech presentation.  She is status post  corticosteroids with a worsening fetal heart rate tracing with recurrent prolonged decelerations and minimal variability.     Priority for C/Section:  emergency      Procedure Details:   The patient was taken to the operating room after risks and benefits have been reviewed. The consent was reviewed and had been signed. Time Out was performed. She was prepped and draped in the normal sterile fashion in the dorsal supine position and a leftward tilt.  A Pfannenstiel skin incision made 2 cm above the pubic symphysis and carried down to the underlying layer of fascia with the scalpel.  The fascia was nicked in the midline and the incision was extended laterally with Zarate scissors.  The anterior aspect of the  incision was grasped with Kocher clamps x2 and elevated and the underlying rectus muscles were dissected off sharply and bluntly.  The inferior aspect of the incision was treated similarly.  The peritoneum was identified and entered bluntly the incision was extended with lateral traction.  A bladder blade was inserted.  The uterus was  incised vertically with the scalpel.  The uterine cavity was entered bluntly and the incision was extended with bandage scissors.  The fetus was then delivered atraumatically using the usual breech maneuvers.  Cord was clamped and cut and the infant was handed off to NICU staff for evaluation.  Cord blood and cord gases were obtained.  The placenta was delivered via uterine massage.  The uterus was then exteriorized and cleared of all clot and debris with clean laparotomy sponges.  Uterus was found to be arcuate with 2 fibroids posteriorly the largest of which was 4 cm.  The hysterotomy was closed in 3 layers with a #1 chromic in a running locking fashion.  Followed by an imbricating stitch of the same suture, hemostasis was achieved with multiple figure-of-eight's.  The serosa was closed with a 2-0 chromic in a running locking stitch the posterior cul-de-sac was irrigated and aspirated.  The hysterotomy was again inspected and noted to be hemostatic.  The uterus was returned to the abdomen.  The paracolic gutters were irrigated and aspirated.  Hysterotomy was inspected for third time and noted to be hemostatic.  Surgicel powder was placed over the incision.  The peritoneum was closed with 2-0 chromic in a running fashion.  The rectus muscles underlying the fascia were made hemostatic with Bovie electrocautery.  The fascia was then closed with 0 Vicryl in a running stitch.  The subcutaneous tissues were irrigated aspirated and made hemostatic with Bovie electrocautery.  The subcutaneous tissues were reapproximated with 3-0 Vicryl and the skin was closed with 4-0 Monocryl in a  subcuticular fashion and sealed with Dermabond.  All counts were correct and the patient was taken to the recovery room in stable condition.       Complications:   None      Disposition:   Mother to Mother Baby/Postpartum  in stable condition currently.   Baby to NICU  in stable condition currently.      Dr. Jagjit Chilel   was responsible for performing the following activities: Retraction, Suction and Irrigation and their skilled assistance was necessary for the success of this case.    Salma Cohn MD  3/14/2022  06:24 EDT      Electronically signed by Salma Cohn MD at 22 1023          Discharge Summary      Salma Cohn MD at 22 1014          [facility]  Discharge Summary/Transfer Summary      Patient: Ladonna Gil            : 1980  MRN: [id number]  CSN: [id number]  Consult:   Consults     No orders found from 2022 to 3/13/2022.             Gestational Age at Discharge:  30w0d      Admission  Diagnosis: PPROM   Discharge Diagnosis:   Patient Active Problem List   Diagnosis   • History of  premature rupture of membranes (PPROM)   •  premature rupture of membranes (PPROM) with unknown onset of labor       Date of Admission: 3/12/2022    Date of Transfer:   3/14/2022    Procedures:  Primary Classical  Section           Service:  Obstetrics    Hospital Course: Patient is a 42-year-old -0-1-0 who presented at 29 weeks and 6 days with grossly ruptured membranes occurring at 8 PM on 3/12/22.  Her pregnancy had been previously uncomplicated other than advanced maternal age.  She was admitted to the hospital given  corticosteroids, magnesium sulfate, and latency antibiotics.  She was not found to be laboring but her fetus was breech presentation.  On PPROM day #3 the fetal heart rate tracing began to have recurrent prolonged decelerations in the setting of minimal variability.  Decision was made to proceed with primary  section due  to a nonreassuring fetal heart tracing.  Patient was delivered on 3/14/2022 via primary classical  section due to nondistention of the lower uterine segment.  The procedure was uncomplicated please see the operative note for full detail on the procedure.  She delivered a viable male infant with Apgars of 2 and 7 weighing 3 pounds 0.3 ounces or 1370 g.  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  Patient was stable postoperatively and compassionate care transfer was secured for her on postop day 0.  Of note she did test positive for COVID-19 on 3/4/2022.  But is currently asymptomatic and had been released from quarantine as of 3/14/2022.      Labs:    Lab Results (last 24 hours)     Procedure Component Value Units Date/Time    Blood Gas, Venous, Cord [[id number]]  (Abnormal) Collected: 22    Specimen: Cord Blood Venous from Umbilical Cord Updated: 22     Site Umbilical     pH, Cord Venous 7.445 pH Units      pCO2, Cord Venous 27.8 mm Hg      Comment: 85 Value below critical limit        pO2, Cord Venous 58.5 mm Hg      HCO3, Cord Venous 19.1 mmol/L      Base Excess, Cord Venous -3.4 mmol/L      O2 Sat, Cord Venous 94.7 %      Hemoglobin, Blood Gas 15.6 g/dL      CO2 Content 19.9 mmol/L      Temperature 37.0 C      Barometric Pressure for Blood Gas --     Comment: N/A        Modality Room Air     FIO2 21 %      Ventilator Mode       Rate 0 Breaths/minute      PIP 0 cmH2O      Comment: Meter: [id number]     :  [id number]        IPAP 0     EPAP 0     O2 Saturation Calculated --     Comment: Calculated O2 saturation result not reported at this site.        Note --     Notified Duncan BORGES RN     Notified By [id number]     Notified Time 2022 05:17     Collection Time --        HOSPITAL LABS:  Lab Results   Component Value Date    WBC 10.96 (H) 2022    HGB  12.1 03/12/2022    HCT 33.3 (L) 03/12/2022    MCV 90.0 03/12/2022     03/12/2022    URICACID 2.7 03/12/2022    AST 22 03/12/2022    ALT 28 03/12/2022     (H) 03/12/2022     Results from last 7 days   Lab Units 03/12/22  2140   ABO TYPING  O   RH TYPING  Positive   ANTIBODY SCREEN  Negative       IMAGING        Discharge Medications     Discharge Medications      ASK your doctor about these medications      Instructions Start Date   aspirin 81 MG chewable tablet   81 mg, Oral, Daily      cetirizine 10 MG tablet  Commonly known as: zyrTEC   10 mg, Oral, Daily      fluticasone 50 MCG/ACT nasal spray  Commonly known as: FLONASE   2 sprays, Nasal, Daily      PrePLUS 27-1 MG tablet   No dose, route, or frequency recorded.      sertraline 50 MG tablet  Commonly known as: ZOLOFT   75 mg, Oral, Daily      Vitamin D (Cholecalciferol) 10 MCG (400 UNIT) tablet  Commonly known as: CHOLECALCIFEROL   400 Units, Oral, Daily             Allergies: No Known Allergies     Discharge Disposition:  To Home    Discharge Condition:  Stable    Discharge Diet: Regular    Activity at Discharge:  Pelvic rest, no lifting, showers only, no driving while on pain medications    Follow-up Appointments: March 29 at 2 PM with Salma Cohn MD  03/14/22  10:14 EDT      Electronically signed by Salma Cohn MD at 03/14/22 [id number]

## (undated) DEVICE — MAT PREVALON MOBL TRANSFR AIR W/PAD REPROC 39X81IN

## (undated) DEVICE — GLV SURG BIOGEL LTX PF 6

## (undated) DEVICE — COATED VICRYL  (POLYGLACTIN 910) SUTURE, VIOLET BRAIDED, STERILE, SYNTHETIC ABSORBABLE SUTURE: Brand: COATED VICRYL

## (undated) DEVICE — TRAP FLD MINIVAC MEGADYNE 100ML

## (undated) DEVICE — 4-PORT MANIFOLD: Brand: NEPTUNE 2

## (undated) DEVICE — SUT VIC 3/0 CT1 27IN DYED J338H

## (undated) DEVICE — SOL IRR H2O BTL 1000ML STRL

## (undated) DEVICE — SUT GUT CHRM 1 CTX 36IN 905H

## (undated) DEVICE — APPL CHLORAPREP TINTED 26ML TEAL

## (undated) DEVICE — PK C/SECT 10

## (undated) DEVICE — GLV SURG BIOGEL LTX PF 6 1/2

## (undated) DEVICE — CLTH CLENS READYCLEANSE PERI CARE PK/5

## (undated) DEVICE — SUT GUT CHRM 2/0 CT1 27IN 811H

## (undated) DEVICE — PATIENT RETURN ELECTRODE, SINGLE-USE, CONTACT QUALITY MONITORING, ADULT, WITH 9FT CORD, FOR PATIENTS WEIGING OVER 33LBS. (15KG): Brand: MEGADYNE

## (undated) DEVICE — SUT MONOCRYL SZ 4 0 18IN PS1 Y682H BX/36

## (undated) DEVICE — ADHS SKIN PREMIERPRO EXOFIN TOPICAL HI/VISC .5ML

## (undated) DEVICE — TBG PENCL TELESCP MEGADYNE SMOKE EVAC 10FT

## (undated) DEVICE — SOL IRR NACL 0.9PCT BT 1000ML